# Patient Record
Sex: MALE | Race: OTHER | Employment: OTHER | URBAN - METROPOLITAN AREA
[De-identification: names, ages, dates, MRNs, and addresses within clinical notes are randomized per-mention and may not be internally consistent; named-entity substitution may affect disease eponyms.]

---

## 2017-05-02 ENCOUNTER — OFFICE VISIT (OUTPATIENT)
Dept: HEMATOLOGY | Age: 72
End: 2017-05-02

## 2017-05-02 ENCOUNTER — HOSPITAL ENCOUNTER (OUTPATIENT)
Dept: LAB | Age: 72
Discharge: HOME | End: 2017-05-02

## 2017-05-02 VITALS
WEIGHT: 260.13 LBS | HEIGHT: 70 IN | DIASTOLIC BLOOD PRESSURE: 98 MMHG | SYSTOLIC BLOOD PRESSURE: 139 MMHG | BODY MASS INDEX: 37.24 KG/M2 | OXYGEN SATURATION: 98 % | HEART RATE: 84 BPM | TEMPERATURE: 97.4 F

## 2017-05-02 DIAGNOSIS — B18.2 CHRONIC HEPATITIS C WITHOUT HEPATIC COMA (HCC): ICD-10-CM

## 2017-05-02 DIAGNOSIS — B18.2 CHRONIC HEPATITIS C WITHOUT HEPATIC COMA (HCC): Primary | ICD-10-CM

## 2017-05-02 PROCEDURE — 99001 SPECIMEN HANDLING PT-LAB: CPT | Performed by: INTERNAL MEDICINE

## 2017-05-02 NOTE — PROGRESS NOTES
93 Ricahrd Calix MD, HANY Leon PA-C Harlen Sinks, NP        at [address], [address] Manjit Bonner  22.     [phone]     FAX: [phone]    at LTAC, located within [facility]     [address], [address] [address]     Ana Jordan, [address]     [phone]     FAX: [phone]       Patient Care Team:  Fahad Lora MD as PCP - General (Unknown Physician Specialty)  Pollo Landaverde MD (Gastroenterology)  Fahad Lora MD (Unknown Physician Specialty)      Problem List  Date Reviewed: 11/1/2016          Codes Class Noted    A-fib [facility]) ICD-10-CM: I48.91  ICD-9-CM: 427.31  11/30/2015        Cirrhosis ([facility] ICD-10-CM: K74.60  ICD-9-CM: 571.5  6/30/2015        Elevated prostate specific antigen (PSA) ICD-10-CM: R97.20  ICD-9-CM: 790.93  5/10/2014        H/O resection of liver ICD-10-CM: Z90.49  ICD-9-CM: V15.29  12/24/2012    Overview Signed 12/24/2012  1:22 AM by Jac Huddleston MD     Partial right lobectomy. Reason for this is not known. Chronic hepatitis C ([facility] ICD-10-CM: B18.2  ICD-9-CM: 070.54  12/5/2012    Overview Addendum 6/30/2016  9:26 AM by Jac Huddleston MD     Peginterferon/ribavirin ~0530. Treated 6 months.   SOF/RBV relapse             Hypertension ICD-10-CM: I10  ICD-9-CM: 401.9  12/5/2012        PAT (paroxysmal atrial tachycardia) ([facility] ICD-10-CM: I47.1  ICD-9-CM: 427.0  12/5/2012    Overview Signed 12/5/2012  2:12 PM by Jac Huddleston MD     Ablation 2002             Colon cancer [facility]) ICD-10-CM: C18.9  ICD-9-CM: 153.9  12/5/2012    Overview Signed 12/5/2012  2:13 PM by Jac Huddleston MD     Partial colectomy 2004             S/P knee surgery ICD-10-CM: T35.974  ICD-9-CM: V45.89  12/5/2012    Overview Signed 12/5/2012  2:14 PM by Jac Huddleston MD     2010                   William Aguila. returns to the [place] [address] for management of cirrhosis secondary to chronic HCV. The active problem list, all pertinent past medical history, medications, liver histology, endoscopic studies, radiologic findings and laboratory findings related to the liver disorder were reviewed with the patient. The patient is a 66 y.o.  male who first found out he had HCV in the early 2000s when he was living in [place]. He was most likely exposed to [address] in the 1970s when he had an episode of acute icteric hepatitis. While living in [place] he was treated with peginterferon and ribavirin in the 2000s. He was treated with SOF and RBV for 12 weeks in 2015. He failed to achieve SVR with these treatments. He was treated with Epclusa and bid Ribavirin between 7/24/2016 -10/15/2016. He completed the prescribed 12 weeks of therapy. He had a virologic response but never became HCV RNA undetectable. The patient underwent a right lobe segmental liver resection while living in [place]. The pathology is not known but the patient states he was told this was benign. The most recent Ultrasound of the liver was performed in 5/2016. Results suggest cirrhosis. No liver mass lesions noted. A Fibroscan demonstrated cirrhosis with a value of 18.8 kPa    The patient notes fatigue, pain in the right side over the liver. The patient has not experienced problems concentrating, swelling of the abdomen, swelling of the lower extremities, hematemesis, hematochezia. The patient has Moderate limitations in functional activities which can be attributed to the liver disease and to other medical problems that are not related to the liver disease. ALLERGIES:  No Known Allergies    MEDICATIONS  Current Outpatient Prescriptions   Medication Sig    oxyCODONE-acetaminophen (PERCOCET 10)  mg per tablet Take 1 Tab by mouth every eight (8) hours as needed for Pain. Max Daily Amount: 3 Tabs.     potassium chloride SR (KLOR-CON 10) 10 mEq tablet Take  by mouth Every Mon, Wed & Sun.    furosemide (LASIX) 40 mg tablet Take  by mouth daily.  tamsulosin (FLOMAX) 0.4 mg capsule Take 0.4 mg by mouth daily.  warfarin (COUMADIN) 5 mg tablet Take 7.5 mg by mouth daily.  lisinopril (PRINIVIL, ZESTRIL) 5 mg tablet Take  by mouth daily. Indications: HYPERTENSION    glimepiride (AMARYL) 2 mg tablet Take  by mouth every morning.  metFORMIN (GLUCOPHAGE) 500 mg tablet Take  by mouth daily (with breakfast).  spironolactone (ALDACTONE) 25 mg tablet Take  by mouth daily. No current facility-administered medications for this visit. SYSTEM REVIEW NOT RELATED TO LIVER DISEASE OR REVIEWED ABOVE:  Constitution systems: Negative for fever, chills, weight gain, weight loss. Eyes: Negative for visual changes. ENT: Negative for sore throat, painful swallowing. Respiratory: Negative for cough, hemoptysis, SOB. Cardiology: Negative for chest pain, palpitations. GI:  Negative for constipation or diarrhea. Positive for low right abdominal pain. : Negative for urinary frequency, dysuria, hematuria, nocturia. Skin: Negative for rash. Hematology: Negative for easy bruising, blood clots. Musculo-skeletal: Negative for back pain, muscle pain, weakness. Neurologic: Negative for headaches, dizziness, vertigo, memory problems not related to HE. Psychology: Negative for anxiety, depression. FAMILY HISTORY  There is no family members with HCV or liver disease    SOCIAL HISTORY:  The patient is . The patient has 3 children, 7 grandchildren. The patient has never used tobacco products. The patient has been abstinent from alcohol since 1970s. The patient used to work as . The patient retired in 2005.       PHYSICAL EXAMINATION:  Visit Vitals    BP (!) 139/98    Pulse 84    Temp 97.4 °F (36.3 °C) (Tympanic)    Ht 5' 10\" (1.778 m)    Wt 260 lb 2 oz (118 kg)    SpO2 98%    BMI 37.32 kg/m2     General: No acute distress. Eyes: Sclera anicteric. ENT: No oral lesions. Thyroid normal.  Nodes: No adenopathy. Skin: No spider angiomata. No jaundice. No palmar erythema. Respiratory: Lungs clear to auscultation. Cardiovascular: Regular heart rate. No murmurs. No JVD. Abdomen: Soft non-tender. Liver size normal to percussion/palpation. Spleen not palpable. No obvious ascites. Extremities: No lower edema. No muscle wasting. No gross arthritic changes. Neurologic: Alert and oriented. Cranial nerves grossly intact. No asterixsis. LABORATORY STUDIES:   [facility] [place] [facility] [address] [place] [address] & Units 5/2/2017 11/1/2016   WBC 3.4 - 10.8 x10E3/uL 5.1 5.1   ANC 1.4 - 7.0 x10E3/uL 2.9 2.8   HGB 12.6 - 17.7 g/dL 13.6 12.9 (L)    - 379 x10E3/uL 182 227   INR 0.8 - 1.2    2.0 (H)   AST 0 - 40 IU/L 66 (H) 65 (H)   ALT 0 - 44 IU/L 72 (H) 103 (H)   Alk Phos 39 - 117 IU/L 79 56   Bili, Total 0.0 - 1.2 mg/dL 0.4 0.3   Bili, Direct 0.00 - 0.40 mg/dL 0.13 0.1   Albumin 3.5 - 4.8 g/dL 3.8 3.8   BUN 8 - 27 mg/dL 19 12   Creat 0.76 - 1.27 mg/dL 0.73 (L) 0.98   Na 134 - 144 mmol/L 142 143   K 3.5 - 5.2 mmol/L 4.0 4.4   Cl 96 - 106 mmol/L 102 105   CO2 18 - 29 mmol/L 24 27   Glucose 65 - 99 mg/dL 110 (H) 135 (H)     Cancer Screening AFP, Serum AFP-L3%   Latest Ref Rng & Units 0.0 - 8.0 ng/mL 0.0 - 9.9 %   5/2/2017 79.8 (H) 12.4 (H)   11/1/2016 5.0 8.5   8/9/2016 4.6 Comment   5/10/2016 4.8 7.8     SEROLOGIES:  8/2012. HBA1C 6.7    Serologies Latest Ref Rn 5/19/2015 12/5/2012   Hep A Ab, Total Negative  Positive (A)   Hep B Surface Ag Negative  Negative   Hep B Core Ab, Total Negative  Positive (A)   Hep B Surface Ab 0.00 - 0.99 Index Value  0.15   Hep C Genotype  2 CANCELED   HCV RT-PCR, Quant  [id number]    IL28B Genotype   TT genotype     5/2015. HCV RNA Positive  10/2015. HCV RNA undetectable  12/2015. HCV RNA log 6.7 intU  5/2016. HCV RNA Log 6.8 intU  6/2016.   HCV RNA positive  8/2016. Undetectable  11/2016.  690 intU  5/2017. HCV RNA Log 7 intU, HCV genotype 2    LIVER HISTOLOGY:  6/2015. FibroScan performed at [address]. EkPa was 18.8. Suggested fibrosis level is F4. ENDOSCOPIC PROCEDURES:  8/2015. EGD by Dr. Nithya Stanley. Small esophageal varices. No banding performed. Repeat in 2 years. RADIOLOGY:  12/2012. Ultrasound of liver. Echogenic consistent with cirrhosis. 4.5x3.6 cm right lobe complex mass lesion. No dilated bile ducts. No ascites. 12/2012. CT scan abdomen with and without IV contrast.  Evidence of prior right lobe resection. The liver otherwise appears normal.  No liver mass lesions. No dilated bile ducts. No bile duct strictures. No ascites. 6/2015. Ultrasound of liver. Echogenic consistent with chronic liver disease. No liver mass lesions. No dilated bile ducts. No ascites. Post surgical changes. 5/2016. Ultrasound of liver. Echogenic consistent with cirrhosis. No liver mass lesions. No dilated bile ducts. No ascites. OTHER TESTING:  Not available or performed    ASSESSMENT AND PLAN:  Chronic HCV with cirrhosis. Liver function is normal.  The platelet count is normal.      Will perform laboratory testing to monitor liver function and degree of liver injury. This will include BMP, hepatic panel, CBC with platelet count,     Will perform and/or review results of HCV viral load     Will repeat HCV genotype to ensure he is still HCV genotype 2. It is most unusual for patients with genotype 2 not to be cured. He was HCV genotype 2. Will perform imaging of the liver with ultrasound. The patient was previously treated for HCV with several different therapies including PEGINF and RBV, SOF and RBV, and most recently Epclusa and RBV. He has not achieved SVR. The patient has HCV genotype 2    Discussed the treatment alternatives.   The SVR/cure rate for HCV now exceeds 90% with just oral anti-viral therapy and no interferon injections or significant side effects for most patients with HCV. The specific treatment is dependent upon genotype, viral load and histology. The patient should be treated with G/P in the HCV Target DAA NR study. The patient was directed to continue all current medications at the current dosages. There are no contraindications for the patient to take any medications that are necessary for treatment of other medical issues. The patient was counseled regarding alcohol consumption. Vaccination for viral hepatitis A and B is not needed. The patient has serologic evidence of prior exposure or vaccination with immunity. [facility] screening will be performed. AFP was ordered today and ultrasound will be scheduled. This demonstrates an elevation in AFP. Will perform a dynamic MRI of liver to further characterize this abnormality and help determine if this represents [facility] Anemia was due to RBV and has now resolved that RBV has been stopped. All of the above issues were discussed with the patient. All questions were answered. The patient expressed a clear understanding of the above. [address] [place] [address] in 3 months to review all data and determine the treatment plan.     Sebastian Bolivar MD  [facility] [place] [address] [facility] HospitalTeri21 Marquez StreetruslanSelect Medical [facility] [place] [facility].  [phone]

## 2017-05-02 NOTE — MR AVS SNAPSHOT
Visit Information Date & Time Provider Department Dept. Phone Encounter #  
 5/2/2017  1:30 PM Bob Lujan. [address] [id number] Follow-up Instructions Return in about 4 months (around 9/2/2017) for Whitney Aguilar. Upcoming Health Maintenance Date Due DTaP/Tdap/Td series (1 - Tdap) 10/18/1964 FOBT Q 1 YEAR AGE 50-75 10/18/1993 ZOSTER VACCINE AGE 60> 10/18/2003 GLAUCOMA SCREENING Q2Y 10/18/2008 Pneumococcal 65+ High/Highest Risk (1 of 2 - PCV13) 10/18/2008 MEDICARE YEARLY EXAM 10/18/2008 INFLUENZA AGE 9 TO ADULT 8/1/2017 Allergies as of 5/2/2017  Review Complete On: 5/2/2017 By: Kaykay Forrest LPN No Known Allergies Current Immunizations  Never Reviewed No immunizations on file. Not reviewed this visit You Were Diagnosed With   
  
 Codes Comments Chronic hepatitis C without hepatic coma (HCC)    -  Primary ICD-10-CM: B18.2 ICD-9-CM: 070.54 Vitals BP Pulse Temp Height(growth percentile) Weight(growth percentile) SpO2  
 (!) 139/98 84 97.4 °F (36.3 °C) (Tympanic) 5' 10\" (1.778 m) 260 lb 2 oz (118 kg) 98% BMI Smoking Status 37.32 kg/m2 Former Smoker Vitals History BMI and BSA Data Body Mass Index Body Surface Area  
 37.32 kg/m 2 2.41 m 2 Preferred Pharmacy Pharmacy Name Phone [address] [place] [address] Drive [phone] Your Updated Medication List  
  
   
This list is accurate as of: 5/2/17  1:59 PM.  Always use your most recent med list.  
  
  
  
  
 COUMADIN 5 mg tablet Generic drug:  warfarin Take 7.5 mg by mouth daily. furosemide 40 mg tablet Commonly known as:  LASIX Take  by mouth daily. glimepiride 2 mg tablet Commonly known as:  AMARYL Take  by mouth every morning. lisinopril 5 mg tablet Commonly known as:  Minus Salk Take  by mouth daily. Indications: HYPERTENSION  
  
 metFORMIN 500 mg tablet Commonly known as:  GLUCOPHAGE Take  by mouth daily (with breakfast). oxyCODONE-acetaminophen  mg per tablet Commonly known as:  PERCOCET 10 Take 1 Tab by mouth every eight (8) hours as needed for Pain. Max Daily Amount: 3 Tabs. potassium chloride SR 10 mEq tablet Commonly known as:  KLOR-CON 10 Take  by mouth Every Mon, Wed & Sun.  
  
 spironolactone 25 mg tablet Commonly known as:  ALDACTONE Take  by mouth daily. tamsulosin 0.4 mg capsule Commonly known as:  FLOMAX Take 0.4 mg by mouth daily. Follow-up Instructions Return in about 4 months (around 9/2/2017) for [address]. To-Do List   
 05/02/2017 Lab:  AFP WITH AFP-L3% 05/02/2017 Lab:  CBC WITH AUTOMATED DIFF   
  
 05/02/2017 Lab:  HCV RNA BY KARLA QL,RFLX TO QT   
  
 05/02/2017 Lab:  HEP C GENOTYPE   
  
 05/02/2017 Lab:  HEPATIC FUNCTION PANEL   
  
 05/02/2017 Lab:  METABOLIC PANEL, BASIC Introducing [facility]! Dear Jaci Weinstein: 
Thank you for requesting a [employer] account. Our records indicate that you already have an active [employer] account. You can access your account anytime at [email or web address] Did you know that you can access your hospital and ER discharge instructions at any time in [employer]? You can also review all of your test results from your hospital stay or ER visit. Additional Information If you have questions, please visit the Frequently Asked Questions section of the [employer]ur[employer] website at [email or web address] Remember, [employer] is NOT to be used for urgent needs. For medical emergencies, dial 911. Now available from your iPhone and Android! Please provide this summary of care documentation to your next provider. Your primary care clinician is listed as Librado Ornelas. If you have any questions after today's visit, please call [phone].

## 2017-05-05 LAB
AFP L3 MFR SERPL: 12.4 % (ref 0–9.9)
AFP SERPL-MCNC: 79.8 NG/ML (ref 0–8)
ALBUMIN SERPL-MCNC: 3.8 G/DL (ref 3.5–4.8)
ALP SERPL-CCNC: 79 IU/L (ref 39–117)
ALT SERPL-CCNC: 72 IU/L (ref 0–44)
AST SERPL-CCNC: 66 IU/L (ref 0–40)
BASOPHILS # BLD AUTO: 0 X10E3/UL (ref 0–0.2)
BASOPHILS NFR BLD AUTO: 1 %
BILIRUB DIRECT SERPL-MCNC: 0.13 MG/DL (ref 0–0.4)
BILIRUB SERPL-MCNC: 0.4 MG/DL (ref 0–1.2)
BUN SERPL-MCNC: 19 MG/DL (ref 8–27)
BUN/CREAT SERPL: 26 (ref 10–24)
CALCIUM SERPL-MCNC: 9.2 MG/DL (ref 8.6–10.2)
CHLORIDE SERPL-SCNC: 102 MMOL/L (ref 96–106)
CO2 SERPL-SCNC: 24 MMOL/L (ref 18–29)
CREAT SERPL-MCNC: 0.73 MG/DL (ref 0.76–1.27)
EOSINOPHIL # BLD AUTO: 0.2 X10E3/UL (ref 0–0.4)
EOSINOPHIL NFR BLD AUTO: 4 %
ERYTHROCYTE [DISTWIDTH] IN BLOOD BY AUTOMATED COUNT: 14 % (ref 12.3–15.4)
GLUCOSE SERPL-MCNC: 110 MG/DL (ref 65–99)
HCT VFR BLD AUTO: 41.3 % (ref 37.5–51)
HCV GENTYP SERPL NAA+PROBE: NORMAL
HCV RNA SERPL NAA+PROBE-ACNC: NORMAL IU/ML
HCV RNA SERPL NAA+PROBE-ACNC: NORMAL IU/ML
HCV RNA SERPL NAA+PROBE-LOG IU: 7.04 {LOG_IU}/ML
HCV RNA SERPL QL NAA+PROBE: POSITIVE
HGB BLD-MCNC: 13.6 G/DL (ref 12.6–17.7)
IMM GRANULOCYTES # BLD: 0.1 X10E3/UL (ref 0–0.1)
IMM GRANULOCYTES NFR BLD: 1 %
LYMPHOCYTES # BLD AUTO: 1.5 X10E3/UL (ref 0.7–3.1)
LYMPHOCYTES NFR BLD AUTO: 29 %
MCH RBC QN AUTO: 33.6 PG (ref 26.6–33)
MCHC RBC AUTO-ENTMCNC: 32.9 G/DL (ref 31.5–35.7)
MCV RBC AUTO: 102 FL (ref 79–97)
MONOCYTES # BLD AUTO: 0.5 X10E3/UL (ref 0.1–0.9)
MONOCYTES NFR BLD AUTO: 9 %
NEUTROPHILS # BLD AUTO: 2.9 X10E3/UL (ref 1.4–7)
NEUTROPHILS NFR BLD AUTO: 56 %
PLATELET # BLD AUTO: 182 X10E3/UL (ref 150–379)
PLEASE NOTE, 550474: NORMAL
POTASSIUM SERPL-SCNC: 4 MMOL/L (ref 3.5–5.2)
PROT SERPL-MCNC: 7 G/DL (ref 6–8.5)
RBC # BLD AUTO: 4.05 X10E6/UL (ref 4.14–5.8)
SODIUM SERPL-SCNC: 142 MMOL/L (ref 134–144)
TEST INFORMATION: NORMAL
WBC # BLD AUTO: 5.1 X10E3/UL (ref 3.4–10.8)

## 2017-06-18 DIAGNOSIS — K74.60 CIRRHOSIS OF LIVER WITHOUT ASCITES, UNSPECIFIED HEPATIC CIRRHOSIS TYPE (HCC): Primary | ICD-10-CM

## 2017-06-18 DIAGNOSIS — R77.2 ELEVATED AFP: ICD-10-CM

## 2017-06-28 ENCOUNTER — HOSPITAL ENCOUNTER (OUTPATIENT)
Dept: LAB | Age: 72
Discharge: HOME | End: 2017-06-28

## 2017-06-28 ENCOUNTER — OFFICE VISIT (OUTPATIENT)
Dept: HEMATOLOGY | Age: 72
End: 2017-06-28

## 2017-06-28 VITALS
SYSTOLIC BLOOD PRESSURE: 133 MMHG | HEART RATE: 61 BPM | RESPIRATION RATE: 16 BRPM | WEIGHT: 262.6 LBS | HEIGHT: 70 IN | DIASTOLIC BLOOD PRESSURE: 82 MMHG | OXYGEN SATURATION: 97 % | TEMPERATURE: 95.2 F | BODY MASS INDEX: 37.59 KG/M2

## 2017-06-28 DIAGNOSIS — K74.60 HEPATIC CIRRHOSIS, UNSPECIFIED HEPATIC CIRRHOSIS TYPE (HCC): Primary | ICD-10-CM

## 2017-06-28 DIAGNOSIS — B18.2 CHRONIC HEPATITIS C WITHOUT HEPATIC COMA (HCC): ICD-10-CM

## 2017-06-28 PROCEDURE — 99001 SPECIMEN HANDLING PT-LAB: CPT | Performed by: NURSE PRACTITIONER

## 2017-06-28 RX ORDER — POTASSIUM CHLORIDE 750 MG/1
CAPSULE, EXTENDED RELEASE ORAL
Refills: 2 | COMMUNITY
Start: 2017-06-18 | End: 2019-08-20

## 2017-06-28 RX ORDER — OXYCODONE HYDROCHLORIDE 5 MG/1
TABLET ORAL
Refills: 0 | COMMUNITY
Start: 2017-04-13 | End: 2017-06-28

## 2017-06-28 RX ORDER — METOPROLOL SUCCINATE 25 MG/1
TABLET, EXTENDED RELEASE ORAL
Refills: 3 | COMMUNITY
Start: 2017-04-17

## 2017-06-28 RX ORDER — PIOGLITAZONEHYDROCHLORIDE 30 MG/1
TABLET ORAL
Refills: 2 | COMMUNITY
Start: 2017-06-17

## 2017-06-28 RX ORDER — OXYCODONE AND ACETAMINOPHEN 10; 325 MG/1; MG/1
1 TABLET ORAL
Qty: 90 TAB | Refills: 0 | Status: SHIPPED | OUTPATIENT
Start: 2017-06-28 | End: 2017-09-06 | Stop reason: ALTCHOICE

## 2017-06-28 NOTE — MR AVS SNAPSHOT
Visit Information Date & Time Provider Department Dept. Phone Encounter #  
 6/28/2017  4:00 PM Gee Palencia NP [facility] [place] of [address] [place] [address] [id number] Your Appointments 6/28/2017  4:00 PM  
Follow Up with Gee Palencia NP [facility] [place] [facility] ([address]) Appt Note: add  
 [address] [place] [address]  
[phone]  
  
   
 [address]  
  
    
 9/6/2017  1:30 PM  
Follow Up with Gee Palencia NP [facility] [place] [facility] ([address]) Appt Note: HCV/Cirrhosis [address] [place] [address]  
[phone] Upcoming Health Maintenance Date Due DTaP/Tdap/Td series (1 - Tdap) 10/18/1964 FOBT Q 1 YEAR AGE 50-75 10/18/1993 ZOSTER VACCINE AGE 60> 10/18/2003 GLAUCOMA SCREENING Q2Y 10/18/2008 Pneumococcal 65+ High/Highest Risk (1 of 2 - PCV13) 10/18/2008 MEDICARE YEARLY EXAM 10/18/2008 INFLUENZA AGE 9 TO ADULT 8/1/2017 Allergies as of 6/28/2017  Review Complete On: 6/28/2017 By: Liudmila Farris No Known Allergies Current Immunizations  Never Reviewed No immunizations on file. Not reviewed this visit You Were Diagnosed With   
  
 Codes Comments Hepatic cirrhosis, unspecified hepatic cirrhosis type ([facility]    -  Primary ICD-10-CM: K74.60 ICD-9-CM: 571.5 Chronic hepatitis C without hepatic coma (HCC)     ICD-10-CM: B18.2 ICD-9-CM: 070.54 Vitals BP Pulse Temp Resp Height(growth percentile) Weight(growth percentile) 133/82 (BP 1 Location: Right arm, BP Patient Position: Sitting) 61 95.2 °F (35.1 °C) (Oral) 16 5' 10\" (1.778 m) 262 lb 9.6 oz (119.1 kg) SpO2 BMI Smoking Status 97% 37.68 kg/m2 Former Smoker BMI and BSA Data Body Mass Index Body Surface Area  
 37.68 kg/m 2 2.43 m 2 Preferred Pharmacy Pharmacy Name Phone [address] [phone] Your Updated Medication List  
  
   
This list is accurate as of: 6/28/17  2:33 PM.  Always use your most recent med list.  
  
  
  
  
 COUMADIN 5 mg tablet Generic drug:  warfarin Take 7.5 mg by mouth daily. furosemide 40 mg tablet Commonly known as:  LASIX Take  by mouth daily. glimepiride 2 mg tablet Commonly known as:  AMARYL Take  by mouth every morning. lisinopril 5 mg tablet Commonly known as:  Megan Gaster Take  by mouth daily. Indications: HYPERTENSION  
  
 metFORMIN 500 mg tablet Commonly known as:  GLUCOPHAGE Take  by mouth daily (with breakfast). metoprolol succinate 25 mg XL tablet Commonly known as:  TOPROL-XL TK 1 T PO D  
  
 oxyCODONE-acetaminophen  mg per tablet Commonly known as:  PERCOCET 10 Take 1 Tab by mouth every eight (8) hours as needed for Pain. Max Daily Amount: 3 Tabs. pioglitazone 30 mg tablet Commonly known as:  ACTOS TK 1 T PO  D  
  
 * potassium chloride SR 10 mEq tablet Commonly known as:  KLOR-CON 10 Take  by mouth Every Mon, Wed & Sun.  
  
 * potassium chloride SA 10 mEq capsule Commonly known as:  MICRO-K  
TK 1 C PO D  
  
 spironolactone 25 mg tablet Commonly known as:  ALDACTONE Take  by mouth daily. tamsulosin 0.4 mg capsule Commonly known as:  FLOMAX Take 0.4 mg by mouth daily. * Notice: This list has 2 medication(s) that are the same as other medications prescribed for you. Read the directions carefully, and ask your doctor or other care provider to review them with you. Prescriptions Printed Refills  
 oxyCODONE-acetaminophen (PERCOCET 10)  mg per tablet 0 Sig: Take 1 Tab by mouth every eight (8) hours as needed for Pain. Max Daily Amount: 3 Tabs. Class: Print  Route: Oral  
  
 To-Do List   
 06/28/2017 Lab:  AFP WITH AFP-L3%   
  
 06/28/2017 Lab:  CBC WITH AUTOMATED DIFF   
  
 06/28/2017 Lab:  HEPATIC FUNCTION PANEL   
  
 06/28/2017 Lab:  METABOLIC PANEL, BASIC   
  
 06/28/2017 Lab:  PROTHROMBIN TIME + INR Introducing [facility]! Dear Cinthia Bean: 
Thank you for requesting a [employer] account. Our records indicate that you already have an active [employer] account. You can access your account anytime at [email or web address] Did you know that you can access your hospital and ER discharge instructions at any time in [employer]? You can also review all of your test results from your hospital stay or ER visit. Additional Information If you have questions, please visit the Frequently Asked Questions section of the [employer] website at [email or web address] Remember, [employer] is NOT to be used for urgent needs. For medical emergencies, dial 911. Now available from your iPhone and Android! Please provide this summary of care documentation to your next provider. Your primary care clinician is listed as Deanna Tierney. If you have any questions after today's visit, please call [phone].

## 2017-06-28 NOTE — PROGRESS NOTES
[address] Kaleb Jacobs MD, HANY Antoine PA-C Eugenie Dale, NP        at [address], [address] Manjit Bonner  22.     [phone]     FAX: [phone]    at [facility]     [address], [address] [address]     [facility], [address]     [phone]     FAX: [phone]       Patient Care Team:  Ada Farnsworth MD as PCP - General (Unknown Physician Specialty)  Mejia Ch MD (Gastroenterology)  Ada Farnsworth MD (Unknown Physician Specialty)      Problem List  Date Reviewed: 6/28/2017          Codes Class Noted    A-fib [facility]) ICD-10-CM: I48.91  ICD-9-CM: 427.31  11/30/2015        Cirrhosis ([facility] ICD-10-CM: K74.60  ICD-9-CM: 571.5  6/30/2015        Elevated prostate specific antigen (PSA) ICD-10-CM: R97.20  ICD-9-CM: 790.93  5/10/2014        H/O resection of liver ICD-10-CM: Z90.49  ICD-9-CM: V15.29  12/24/2012    Overview Signed 12/24/2012  1:22 AM by Bob Parks MD     Partial right lobectomy. Reason for this is not known. Chronic hepatitis C ([facility] ICD-10-CM: B18.2  ICD-9-CM: 070.54  12/5/2012    Overview Addendum 6/30/2016  9:26 AM by Bob Parks MD     Peginterferon/ribavirin ~9854. Treated 6 months.   SOF/RBV relapse             Hypertension ICD-10-CM: I10  ICD-9-CM: 401.9  12/5/2012        PAT (paroxysmal atrial tachycardia) ([facility] ICD-10-CM: I47.1  ICD-9-CM: 427.0  12/5/2012    Overview Signed 12/5/2012  2:12 PM by Bob Parks MD     Ablation 2002             Colon cancer [facility]) ICD-10-CM: C18.9  ICD-9-CM: 153.9  12/5/2012    Overview Signed 12/5/2012  2:13 PM by Bob Parks MD     Partial colectomy 2004             S/P knee surgery ICD-10-CM: S99.492  ICD-9-CM: V45.89  12/5/2012    Overview Signed 12/5/2012  2:14 PM by Bob Parks MD     2010                   [facility]. returns to the [place] [address] for management of cirrhosis secondary to chronic HCV. The active problem list, all pertinent past medical history, medications, liver histology, endoscopic studies, radiologic findings and laboratory findings related to the liver disorder were reviewed with the patient. The patient is a 66 y.o.  male who first found out he had HCV in the early 2000s when he was living in [place]. He was most likely exposed to [address] in the 1970s when he had an episode of acute icteric hepatitis. While living in [place] he was treated with peginterferon and ribavirin in the 2000s. He was treated with SOF and RBV for 12 weeks in 2015. He failed to achieve SVR with these treatments. He was treated with Epclusa and bid Ribavirin between 7/24/2016 -10/15/2016. He completed the prescribed 12 weeks of therapy. He had a virologic response but never became HCV RNA undetectable. The patient underwent a right lobe segmental liver resection while living in [place]. The pathology is not known but the patient states he was told this was benign. The most recent Ultrasound of the liver was performed in 5/2016. Results suggest cirrhosis. No liver mass lesions noted. A Fibroscan demonstrated cirrhosis with a value of 18.8 kPa    The patient notes fatigue, pain in the right side over the liver. The patient has not experienced problems concentrating, swelling of the abdomen, swelling of the lower extremities, hematemesis, hematochezia. The patient has Moderate limitations in functional activities which can be attributed to the liver disease and to other medical problems that are not related to the liver disease.       ALLERGIES:  No Known Allergies    MEDICATIONS  Current Outpatient Prescriptions   Medication Sig    metoprolol succinate (TOPROL-XL) 25 mg XL tablet TK 1 T PO D    pioglitazone (ACTOS) 30 mg tablet TK 1 T PO  D    potassium chloride SA (MICRO-K) 10 mEq capsule TK 1 C PO D    oxyCODONE-acetaminophen (PERCOCET 10)  mg per tablet Take 1 Tab by mouth every eight (8) hours as needed for Pain. Max Daily Amount: 3 Tabs.  potassium chloride SR (KLOR-CON 10) 10 mEq tablet Take  by mouth Every Mon, Wed & Sun.    furosemide (LASIX) 40 mg tablet Take  by mouth daily.  tamsulosin (FLOMAX) 0.4 mg capsule Take 0.4 mg by mouth daily.  warfarin (COUMADIN) 5 mg tablet Take 7.5 mg by mouth daily.  lisinopril (PRINIVIL, ZESTRIL) 5 mg tablet Take  by mouth daily. Indications: HYPERTENSION    glimepiride (AMARYL) 2 mg tablet Take  by mouth every morning.  metFORMIN (GLUCOPHAGE) 500 mg tablet Take  by mouth daily (with breakfast).  spironolactone (ALDACTONE) 25 mg tablet Take  by mouth daily. No current facility-administered medications for this visit. SYSTEM REVIEW NOT RELATED TO LIVER DISEASE OR REVIEWED ABOVE:  Constitution systems: Negative for fever, chills, weight gain, weight loss. Eyes: Negative for visual changes. ENT: Negative for sore throat, painful swallowing. Respiratory: Negative for cough, hemoptysis, SOB. Cardiology: Negative for chest pain, palpitations. GI:  Negative for constipation or diarrhea. Positive for low right abdominal pain. : Negative for urinary frequency, dysuria, hematuria, nocturia. Skin: Negative for rash. Hematology: Negative for easy bruising, blood clots. Musculo-skeletal: Negative for back pain, muscle pain, weakness. Neurologic: Negative for headaches, dizziness, vertigo, memory problems not related to HE. Psychology: Negative for anxiety, depression. FAMILY HISTORY  There is no family members with HCV or liver disease    SOCIAL HISTORY:  The patient is . The patient has 3 children, 7 grandchildren. The patient has never used tobacco products. The patient has been abstinent from alcohol since 1970s. The patient used to work as . The patient retired in 2005. PHYSICAL EXAMINATION:  Visit Vitals    /82 (BP 1 Location: Right arm, BP Patient Position: Sitting)    Pulse 61    Temp 95.2 °F (35.1 °C) (Oral)    Resp 16    Ht 5' 10\" (1.778 m)    Wt 262 lb 9.6 oz (119.1 kg)    SpO2 97%    BMI 37.68 kg/m2     General: No acute distress. Eyes: Sclera anicteric. ENT: No oral lesions. Thyroid normal.  Nodes: No adenopathy. Skin: No spider angiomata. No jaundice. No palmar erythema. Respiratory: Lungs clear to auscultation. Cardiovascular: Regular heart rate. No murmurs. No JVD. Abdomen: Soft non-tender. Liver size normal to percussion/palpation. Spleen not palpable. No obvious ascites. Extremities: No lower edema. No muscle wasting. No gross arthritic changes. Neurologic: Alert and oriented. Cranial nerves grossly intact. No asterixsis. LABORATORY STUDIES:   [facility] [address] & Units 5/2/2017 11/1/2016   WBC 3.4 - 10.8 x10E3/uL 5.1 5.1   ANC 1.4 - 7.0 x10E3/uL 2.9 2.8   HGB 12.6 - 17.7 g/dL 13.6 12.9 (L)    - 379 x10E3/uL 182 227   INR 0.8 - 1.2    2.0 (H)   AST 0 - 40 IU/L 66 (H) 65 (H)   ALT 0 - 44 IU/L 72 (H) 103 (H)   Alk Phos 39 - 117 IU/L 79 56   Bili, Total 0.0 - 1.2 mg/dL 0.4 0.3   Bili, Direct 0.00 - 0.40 mg/dL 0.13 0.1   Albumin 3.5 - 4.8 g/dL 3.8 3.8   BUN 8 - 27 mg/dL 19 12   Creat 0.76 - 1.27 mg/dL 0.73 (L) 0.98   Na 134 - 144 mmol/L 142 143   K 3.5 - 5.2 mmol/L 4.0 4.4   Cl 96 - 106 mmol/L 102 105   CO2 18 - 29 mmol/L 24 27   Glucose 65 - 99 mg/dL 110 (H) 135 (H)     Cancer Screening Latest Ref Rng & Units 5/2/2017 11/1/2016 8/9/2016   AFP, Serum 0.0 - 8.0 ng/mL 79.8 (H) 5.0 4.6   AFP-L3% 0.0 - 9.9 % 12.4 (H) 8.5 Comment     SEROLOGIES:  8/2012.   HBA1C 6.7    Serologies Latest Ref Rng 5/19/2015 12/5/2012   Hep A Ab, Total Negative  Positive (A)   Hep B Surface Ag Negative  Negative   Hep B Core Ab, Total Negative  Positive (A)   Hep B Surface Ab 0.00 - 0.99 Index Value  0.15   Hep C Genotype  2 CANCELED   HCV RT-PCR, Quant  [id number]    IL28B Genotype   TT genotype     5/2015. HCV RNA Positive  10/2015. HCV RNA undetectable  12/2015. HCV RNA log 6.7 intU  5/2016. HCV RNA Log 6.8 intU  6/2016. HCV RNA positive  8/2016. Undetectable  11/2016.  690 intU  5/2017. HCV RNA Log 7 intU, HCV genotype 2    LIVER HISTOLOGY:  6/2015. FibroScan performed at [address]. EkPa was 18.8. Suggested fibrosis level is F4. ENDOSCOPIC PROCEDURES:  8/2015. EGD by Dr. Cande Guevara. Small esophageal varices. No banding performed. Repeat in 2 years. RADIOLOGY:  12/2012. Ultrasound of liver. Echogenic consistent with cirrhosis. 4.5x3.6 cm right lobe complex mass lesion. No dilated bile ducts. No ascites. 12/2012. CT scan abdomen with and without IV contrast.  Evidence of prior right lobe resection. The liver otherwise appears normal.  No liver mass lesions. No dilated bile ducts. No bile duct strictures. No ascites. 6/2015. Ultrasound of liver. Echogenic consistent with chronic liver disease. No liver mass lesions. No dilated bile ducts. No ascites. Post surgical changes. 5/2016. Ultrasound of liver. Echogenic consistent with cirrhosis. No liver mass lesions. No dilated bile ducts. No ascites. OTHER TESTING:  Not available or performed    ASSESSMENT AND PLAN:  Chronic HCV with cirrhosis. The most recent laboratory studies indicate that the liver transaminases are elevated, alkaline phosphatase is normal, tests of hepatic synthetic and metabolic function are normal, and the platelet count is normal.  Will perform laboratory testing to monitor liver function and degree of liver injury. This will include hepatic panel, a CBC w/ diff, a BMP, a PT/INR, and an AFP-L3%. Complications of cirrhosis were discussed in detail. We discussed thrombocytopenia, portal hypertension, varices, GI bleeding, peripheral edema, ascites, hepatic encephalopathy, and hepatocellular carcinoma. We discussed the need for follow ups on a regular basis, at 3 month intervals to monitor for complications. We discussed the need for every 6 month liver imaging studies. I am very concerned that the patient may have developed an [address] His AFP has jumped from the normal range to about 80. A dynamic MRI was ordered for him a couple of weeks ago, but he has not had this completed yet. I stressed the importance of following up on this as soon as possible. The patient was previously treated for HCV with several different therapies including PEGINF and RBV, SOF and RBV, and most recently Epclusa and RBV. He has not achieved SVR. The patient has HCV genotype 2    Discussed the treatment alternatives. The SVR/cure rate for HCV now exceeds 90% with just oral anti-viral therapy and no interferon injections or significant side effects for most patients with HCV. The specific treatment is dependent upon genotype, viral load and histology. The patient should be treated with G/P in the HCV Target DAA NR study. The patient was directed to continue all current medications at the current dosages. There are no contraindications for the patient to take any medications that are necessary for treatment of other medical issues. The patient was counseled regarding alcohol consumption. Vaccination for viral hepatitis A and B is not needed. The patient has serologic evidence of prior exposure or vaccination with immunity. Anemia was due to RBV and has now resolved that RBV has been stopped. All of the above issues were discussed with the patient. All questions were answered. The patient expressed a clear understanding of the above. 30 minutes total time spent with this patient with more than 50% of this time spent counseling and coordinating care as described above. [facility] [place] [address] in 3 months to review all data and determine the treatment plan.     Josselin Sorensen, NP   L[facility] of [facility], [address] [facility]   [place], [address] [facility] [address]   [phone]

## 2017-06-29 LAB
AFP L3 MFR SERPL: 14.5 % (ref 0–9.9)
AFP SERPL-MCNC: 46.7 NG/ML (ref 0–8)
ALBUMIN SERPL-MCNC: 4.1 G/DL (ref 3.5–4.8)
ALP SERPL-CCNC: 63 IU/L (ref 39–117)
ALT SERPL-CCNC: 90 IU/L (ref 0–44)
AST SERPL-CCNC: 73 IU/L (ref 0–40)
BASOPHILS # BLD AUTO: 0 X10E3/UL (ref 0–0.2)
BASOPHILS NFR BLD AUTO: 1 %
BILIRUB DIRECT SERPL-MCNC: 0.16 MG/DL (ref 0–0.4)
BILIRUB SERPL-MCNC: 0.6 MG/DL (ref 0–1.2)
BUN SERPL-MCNC: 20 MG/DL (ref 8–27)
BUN/CREAT SERPL: 24 (ref 10–24)
CALCIUM SERPL-MCNC: 9.9 MG/DL (ref 8.6–10.2)
CHLORIDE SERPL-SCNC: 99 MMOL/L (ref 96–106)
CO2 SERPL-SCNC: 23 MMOL/L (ref 18–29)
CREAT SERPL-MCNC: 0.82 MG/DL (ref 0.76–1.27)
EOSINOPHIL # BLD AUTO: 0.2 X10E3/UL (ref 0–0.4)
EOSINOPHIL NFR BLD AUTO: 3 %
ERYTHROCYTE [DISTWIDTH] IN BLOOD BY AUTOMATED COUNT: 13.7 % (ref 12.3–15.4)
GLUCOSE SERPL-MCNC: 84 MG/DL (ref 65–99)
HCT VFR BLD AUTO: 42.4 % (ref 37.5–51)
HGB BLD-MCNC: 14.4 G/DL (ref 12.6–17.7)
IMM GRANULOCYTES # BLD: 0.1 X10E3/UL (ref 0–0.1)
IMM GRANULOCYTES NFR BLD: 1 %
INR PPP: 1.4 (ref 0.8–1.2)
LYMPHOCYTES # BLD AUTO: 1.8 X10E3/UL (ref 0.7–3.1)
LYMPHOCYTES NFR BLD AUTO: 32 %
MCH RBC QN AUTO: 33.5 PG (ref 26.6–33)
MCHC RBC AUTO-ENTMCNC: 34 G/DL (ref 31.5–35.7)
MCV RBC AUTO: 99 FL (ref 79–97)
MONOCYTES # BLD AUTO: 0.7 X10E3/UL (ref 0.1–0.9)
MONOCYTES NFR BLD AUTO: 13 %
NEUTROPHILS # BLD AUTO: 2.9 X10E3/UL (ref 1.4–7)
NEUTROPHILS NFR BLD AUTO: 50 %
PLATELET # BLD AUTO: 170 X10E3/UL (ref 150–379)
POTASSIUM SERPL-SCNC: 4.4 MMOL/L (ref 3.5–5.2)
PROT SERPL-MCNC: 8 G/DL (ref 6–8.5)
PROTHROMBIN TIME: 14.3 SEC (ref 9.1–12)
RBC # BLD AUTO: 4.3 X10E6/UL (ref 4.14–5.8)
SODIUM SERPL-SCNC: 139 MMOL/L (ref 134–144)
WBC # BLD AUTO: 5.6 X10E3/UL (ref 3.4–10.8)

## 2017-07-10 ENCOUNTER — TELEPHONE (OUTPATIENT)
Dept: HEMATOLOGY | Age: 72
End: 2017-07-10

## 2017-07-10 NOTE — TELEPHONE ENCOUNTER
Patient is asking for a open MRI. He have a MRI scheduled at [facility] on the 12 of July, but can't do the appt because they do not have a open MRI. So he is asking if he can have the MRI done here at  [facility] but for it to be open because he has claustrophobia.

## 2017-07-10 NOTE — TELEPHONE ENCOUNTER
Spoke to patient this AM to inform him that the order had been faxed to [facility] for scheduling, Informed him that there are no open MRI locations in the area. Pt agreed to have the procedure done at [facility] as planned.

## 2017-07-10 NOTE — TELEPHONE ENCOUNTER
Fax [phone]  Patient needs orders put in the system or faxed over or patient's appointment will be canceled. Schedule says it is supposed to be MRI of liver. I let deanna know that I will send a message to HANY Yo.

## 2017-07-11 NOTE — TELEPHONE ENCOUNTER
Called [facility] MRI department to verify that MRI orders were received. Faxed order [id number] to fax number [phone] and verified with MRI technician that orders were received. After confirmation patient was called and informed the final step is to reschedule appointment. Patient confirmed understanding.

## 2017-07-11 NOTE — TELEPHONE ENCOUNTER
Patient called to ask for some help. [facility] radiology called him and informed him that his appointment for tomorrow is canceled due to no orders in the system. I told him that we have the orders in the system and that if they need it faxed over that we would be more than happy to do so. Patient confirmed understanding but requested that we contact [facility] radiology directly to resolve situation.

## 2017-07-26 ENCOUNTER — TELEPHONE (OUTPATIENT)
Dept: HEMATOLOGY | Age: 72
End: 2017-07-26

## 2017-07-27 ENCOUNTER — TELEPHONE (OUTPATIENT)
Dept: HEMATOLOGY | Age: 72
End: 2017-07-27

## 2017-07-27 ENCOUNTER — DOCUMENTATION ONLY (OUTPATIENT)
Dept: HEMATOLOGY | Age: 72
End: 2017-07-27

## 2017-07-27 NOTE — TELEPHONE ENCOUNTER
called in response to a missed call from HANY Dutton. Patient states that he will be available all day today. Please call at earliest convenience.

## 2017-07-27 NOTE — TELEPHONE ENCOUNTER
Patient would like to know if the CD with the MRI needs to be dropped off at the [facility] office in [place]. If so, he would like the address and number to the office.

## 2017-07-27 NOTE — PROGRESS NOTES
Pt scheduled with interventional radiologist Dr. Chelita Pope at [facility] Friday 07/28/17 at 1500.

## 2017-07-27 NOTE — PROGRESS NOTES
NN reached out to Mariana Owens regarding pt referral to IR Dr. Saúl Grace, pt's name and contact information provided.

## 2017-07-27 NOTE — TELEPHONE ENCOUNTER
Notified YG Perez who is in the process of calling this patient for education on treatment procedure.

## 2017-08-07 DIAGNOSIS — R77.2 ELEVATED AFP: ICD-10-CM

## 2017-08-07 DIAGNOSIS — K74.60 CIRRHOSIS OF LIVER WITHOUT ASCITES, UNSPECIFIED HEPATIC CIRRHOSIS TYPE (HCC): ICD-10-CM

## 2017-08-10 NOTE — CONSULTS
Interventional Oncology Consult Note    Patient: Jessy Cadet. Sex: male          DOA: (Not on file)    7/31/2017    Referring physician: Dr. Veena Thomason    CC: Hepatoma. HPI:     66 y.o.  male with cirrhosis secondary to chronic HCV. Lizzette Samuel was Dx in early 2000s while residing in [place]. Patient remains HCV RNA + despite second HCV treatment. Patient also had right lobe segmental resection while living in [place]. Pathology was benign according to patient. Hemangioma? Most recent MRI (7/22/2017) showed segments 5/6/7/1 lesions with largest questionably infiltrating HCC in segment 7. Left lobe shows no lesions. No ascites. Portal vein is patent. No extra-hepatic ds. Most recent [address] liver 5/16 showed no masses but suggested cirrhosis. \Fibroscan demonstrated cirrhosis (18.8 kPa). \No complications of cirrhosis. ECOG 0. H/O IVDA and EtOh use in the past.    AFP 6/28/2017 46.7/14.5. Patient was referred to me by Dr. Veena Thomason for a discussion regarding possible infiltrating lesion treatment. Patient had no lesion Bx or random liver Bx as of yet. Patient came to my office with his wife. Patient does not have bleeding, ascites, jaundice, peripheral edema or encephalopathy. Patient states RUQ intermittent pain since liver surgery. Patient has good appetite.     Past Medical History:   Diagnosis Date    [facility] ([facility]     Diabetes ([facility] 64y/o    Elevated PSA     Headache     Hemangioma     Hep C w/o coma, chronic (HCC)     Hypertension 64y/o       Past Surgical History:   Procedure Laterality Date    COLONOSCOPY N/A 6/20/2016    COLONOSCOPY performed by Samreen Barahona MD at [facility] ENDOSCOPY    HX COLECTOMY      HX COLONOSCOPY      HX OTHER SURGICAL      liver surgery       Family History   Problem Relation Age of Onset    Stroke Father        Social History     Social History    Marital status:      Spouse name: N/A    Number of children: N/A    Years of education: N/A     Social History Main Topics    Smoking status: Former Smoker     Quit date: 8/4/1991    Smokeless tobacco: Never Used    Alcohol use No    Drug use: No    Sexual activity: Not on file     Other Topics Concern    Not on file     Social History Narrative    ** Merged History Encounter **            Prior to Admission medications    Medication Sig Start Date End Date Taking? Authorizing Provider   metoprolol succinate (TOPROL-XL) 25 mg XL tablet TK 1 T PO D 4/17/17   Historical Provider   pioglitazone (ACTOS) 30 mg tablet TK 1 T PO  D 6/17/17   Historical Provider   potassium chloride SA (MICRO-K) 10 mEq capsule TK 1 C PO D 6/18/17   Historical Provider   oxyCODONE-acetaminophen (PERCOCET 10)  mg per tablet Take 1 Tab by mouth every eight (8) hours as needed for Pain. Max Daily Amount: 3 Tabs. 6/28/17   Kelvin Martin NP   potassium chloride SR (KLOR-CON 10) 10 mEq tablet Take  by mouth Every Mon, Wed & Sun.    Historical Provider   furosemide (LASIX) 40 mg tablet Take  by mouth daily. Historical Provider   tamsulosin (FLOMAX) 0.4 mg capsule Take 0.4 mg by mouth daily. Historical Provider   warfarin (COUMADIN) 5 mg tablet Take 7.5 mg by mouth daily. Historical Provider   lisinopril (PRINIVIL, ZESTRIL) 5 mg tablet Take  by mouth daily. Indications: HYPERTENSION    Historical Provider   glimepiride (AMARYL) 2 mg tablet Take  by mouth every morning. Historical Provider   metFORMIN (GLUCOPHAGE) 500 mg tablet Take  by mouth daily (with breakfast). Historical Provider   spironolactone (ALDACTONE) 25 mg tablet Take  by mouth daily. Historical Provider       No Known Allergies       Review of Systems  A comprehensive review of systems was negative except for that written in the History of Present Illness. Physical Exam:      There were no vitals taken for this visit. Physical Exam:    General: No acute distress. \b Eyes: Sclera anicteric.  \b ENT: No oral lesions.  Thyroid normal.\b Nodes: No adenopathy. \b Skin: No spider angiomata.  No jaundice.  No palmar erythema. \b Respiratory: Lungs clear to auscultation. \b Cardiovascular: Regular heart rate.  No murmurs.  No JVD. \b Abdomen: Soft non-tender.  Liver size normal to percussion/palpation.  Spleen not palpable. No obvious ascites. \b Extremities: No lower edema.  No muscle wasting.  No gross arthritic changes. \b Neurologic: Alert and oriented.  Cranial nerves grossly intact.  No asterixsis.     Labs Reviewed:    [address] [place] [address] Units 5/2/2017 11/1/2016   WBC 3.4 - 10.8 x10E3/uL 5.1 5.1   ANC 1.4 - 7.0 x10E3/uL 2.9 2.8   HGB 12.6 - 17.7 g/dL 13.6 12.9 (L)    - 379 x10E3/uL 182 227   INR 0.8 - 1.2     2.0 (H)   AST 0 - 40 IU/L 66 (H) 65 (H)   ALT 0 - 44 IU/L 72 (H) 103 (H)   Alk Phos 39 - 117 IU/L 79 56   Bili, Total 0.0 - 1.2 mg/dL 0.4 0.3   Bili, Direct 0.00 - 0.40 mg/dL 0.13 0.1   Albumin 3.5 - 4.8 g/dL 3.8 3.8   BUN 8 - 27 mg/dL 19 12   Creat 0.76 - 1.27 mg/dL 0.73 (L) 0.98   Na 134 - 144 mmol/L 142 143   K 3.5 - 5.2 mmol/L 4.0 4.4   Cl 96 - 106 mmol/L 102 105   CO2 18 - 29 mmol/L 24 27   Glucose 65 - 99 mg/dL 110 (H) 135 (H)    \pard\jtrr2Iwloij Screening Latest Ref Rng & Units 5/2/2017 11/1/2016 8/9/2016   AFP, Serum 0.0 - 8.0 ng/mL 79.8 (H) 5.0 4.6   AFP-L3% 0.0 - 9.9 % 12.4 (H) 8.5 Comment    \pardSEROLOGIES:/2012.  HBA1C 6.7\'a0\pard\htpo3Ryiguvizba Latest Ref Rng 5/19/2015 12/5/2012   Hep A Ab, Total Negative   Positive (A)   Hep B Surface Ag Negative   Negative   Hep B Core Ab, Total Negative   Positive (A)   Hep B Surface Ab 0.00 - 0.99 Index Value   0.15   Hep C Genotype   2 CANCELED   HCV RT-PCR, Quant   [id number]     IL28B Genotype     TT genotype    /2015.  HCV RNA Positive/2015.  HCV RNA undetectable/2015.  HCV RNA log 6.7 intU/2016.  HCV RNA Log 6.8 intU/2016.  HCV RNA positive/2016.  Undetectable/2016.  112 intU/2017.  HCV RNA Log 7 intU, HCV genotype 2\pard \b LIVER HISTOLOGY:/2015.  FibroScan performed at [address]. EkPa was 18.8.  Suggested fibrosis level is F4.\'a0\b ENDOSCOPIC PROCEDURES:/2015.  EGD by Dr. Tesha Paredes.  Small esophageal varices.  No banding performed.  Repeat in 2 years.   \b  \b RADIOLOGY:/2012.  Ultrasound of liver.   Echogenic consistent with cirrhosis.  4.5x3.6 cm right lobe complex mass lesion.  No dilated bile ducts.  No ascites./2012.  CT scan abdomen with and without IV contrast.  Evidence of prior right lobe resection.  The liver otherwise appears normal.  No liver mass lesions.  No dilated bile ducts.  No bile duct strictures.  No ascites./2015.  Ultrasound of liver.  Echogenic consistent with chronic liver disease.  No liver mass lesions.  No dilated bile ducts.  No ascites. Post surgical changes./2016.  Ultrasound of liver.   Echogenic consistent with cirrhosis.  No liver mass lesions.  No dilated bile ducts.  No ascites. liver as above. Assessment/Plan     66 y.o.  male with cirrhosis secondary to chronic HCV and recently discovered possibly infiltrating HCC of the right lobe as above. CT guided core needle Bx of the right lobe lesion will be done first at [facility] as well as CT liver tumor protocol ( Y90 preoperative planning). If Bx is positive for [address] patient will be schedule for Y90 TheraSphere right hepatic lobe radioembolization treatment as long as liver synthetic fraction, patient vascular anatomy and lung shunt fraction permit. Risks and complications of radioembolization as well as liver biopsy were discussed in detail with patient as well as patients wife. Risks of bleeding, infection, poor tissue yield requiring repeat of the biopsy. Risks of poor tumor response and tumor progression in the intrahepatic/extrahepatic fashion.  Risks of radiation induced pneumonitis, gastroenteritis and hepatitis, which may, in rare instances result in acute liver failure and mortality.

## 2017-08-17 ENCOUNTER — APPOINTMENT (OUTPATIENT)
Dept: CT IMAGING | Age: 72
End: 2017-08-17
Attending: RADIOLOGY
Payer: MEDICARE

## 2017-08-17 ENCOUNTER — HOSPITAL ENCOUNTER (OUTPATIENT)
Dept: CT IMAGING | Age: 72
Discharge: HOME | End: 2017-08-17
Attending: RADIOLOGY | Admitting: RADIOLOGY
Payer: MEDICARE

## 2017-08-17 VITALS
SYSTOLIC BLOOD PRESSURE: 110 MMHG | HEART RATE: 62 BPM | BODY MASS INDEX: 37.91 KG/M2 | OXYGEN SATURATION: 97 % | TEMPERATURE: 97 F | DIASTOLIC BLOOD PRESSURE: 72 MMHG | WEIGHT: 264.8 LBS | HEIGHT: 70 IN | RESPIRATION RATE: 14 BRPM

## 2017-08-17 DIAGNOSIS — C22.0 HEPATOCELLULAR CARCINOMA (HCC): ICD-10-CM

## 2017-08-17 LAB
ALBUMIN SERPL-MCNC: 3.5 G/DL (ref 3.4–5)
ALBUMIN/GLOB SERPL: 0.7 {RATIO} (ref 0.8–1.7)
ALP SERPL-CCNC: 86 U/L (ref 45–117)
ALT SERPL-CCNC: 94 U/L (ref 16–61)
ANION GAP SERPL CALC-SCNC: 8 MMOL/L (ref 3–18)
APTT PPP: 25.2 SEC (ref 23–36.4)
AST SERPL-CCNC: 68 U/L (ref 15–37)
BILIRUB DIRECT SERPL-MCNC: 0.2 MG/DL (ref 0–0.2)
BILIRUB SERPL-MCNC: 0.4 MG/DL (ref 0.2–1)
BUN SERPL-MCNC: 22 MG/DL (ref 7–18)
BUN/CREAT SERPL: 20 (ref 12–20)
CALCIUM SERPL-MCNC: 9.3 MG/DL (ref 8.5–10.1)
CHLORIDE SERPL-SCNC: 103 MMOL/L (ref 100–108)
CO2 SERPL-SCNC: 29 MMOL/L (ref 21–32)
CREAT SERPL-MCNC: 1.11 MG/DL (ref 0.6–1.3)
ERYTHROCYTE [DISTWIDTH] IN BLOOD BY AUTOMATED COUNT: 13.1 % (ref 11.6–14.5)
GLOBULIN SER CALC-MCNC: 4.9 G/DL (ref 2–4)
GLUCOSE BLD STRIP.AUTO-MCNC: 66 MG/DL (ref 70–110)
GLUCOSE SERPL-MCNC: 106 MG/DL (ref 74–99)
HCT VFR BLD AUTO: 42.9 % (ref 36–48)
HGB BLD-MCNC: 14.4 G/DL (ref 13–16)
INR PPP: 1 (ref 0.8–1.2)
MCH RBC QN AUTO: 34.3 PG (ref 24–34)
MCHC RBC AUTO-ENTMCNC: 33.6 G/DL (ref 31–37)
MCV RBC AUTO: 102.1 FL (ref 74–97)
PLATELET # BLD AUTO: 157 K/UL (ref 135–420)
PMV BLD AUTO: 10.1 FL (ref 9.2–11.8)
POTASSIUM SERPL-SCNC: 3.9 MMOL/L (ref 3.5–5.5)
PROT SERPL-MCNC: 8.4 G/DL (ref 6.4–8.2)
PROTHROMBIN TIME: 12.6 SEC (ref 11.5–15.2)
RBC # BLD AUTO: 4.2 M/UL (ref 4.7–5.5)
SODIUM SERPL-SCNC: 140 MMOL/L (ref 136–145)
WBC # BLD AUTO: 5.6 K/UL (ref 4.6–13.2)

## 2017-08-17 PROCEDURE — 88333 PATH CONSLTJ SURG CYTO XM 1: CPT | Performed by: RADIOLOGY

## 2017-08-17 PROCEDURE — 82962 GLUCOSE BLOOD TEST: CPT

## 2017-08-17 PROCEDURE — 74170 CT ABD WO CNTRST FLWD CNTRST: CPT

## 2017-08-17 PROCEDURE — 74011250636 HC RX REV CODE- 250/636: Performed by: RADIOLOGY

## 2017-08-17 PROCEDURE — 47000 NEEDLE BIOPSY OF LIVER PERQ: CPT

## 2017-08-17 PROCEDURE — 74011250636 HC RX REV CODE- 250/636

## 2017-08-17 PROCEDURE — 88313 SPECIAL STAINS GROUP 2: CPT | Performed by: RADIOLOGY

## 2017-08-17 PROCEDURE — 88307 TISSUE EXAM BY PATHOLOGIST: CPT | Performed by: RADIOLOGY

## 2017-08-17 PROCEDURE — 80076 HEPATIC FUNCTION PANEL: CPT | Performed by: RADIOLOGY

## 2017-08-17 PROCEDURE — 85730 THROMBOPLASTIN TIME PARTIAL: CPT | Performed by: RADIOLOGY

## 2017-08-17 PROCEDURE — 85610 PROTHROMBIN TIME: CPT | Performed by: RADIOLOGY

## 2017-08-17 PROCEDURE — 74011000272 HC RX REV CODE- 272

## 2017-08-17 PROCEDURE — 88334 PATH CONSLTJ SURG CYTO XM EA: CPT | Performed by: RADIOLOGY

## 2017-08-17 PROCEDURE — 85027 COMPLETE CBC AUTOMATED: CPT | Performed by: RADIOLOGY

## 2017-08-17 PROCEDURE — 80048 BASIC METABOLIC PNL TOTAL CA: CPT | Performed by: RADIOLOGY

## 2017-08-17 PROCEDURE — 74011636320 HC RX REV CODE- 636/320: Performed by: RADIOLOGY

## 2017-08-17 RX ORDER — FENTANYL CITRATE 50 UG/ML
INJECTION, SOLUTION INTRAMUSCULAR; INTRAVENOUS
Status: COMPLETED
Start: 2017-08-17 | End: 2017-08-17

## 2017-08-17 RX ORDER — FENTANYL CITRATE 50 UG/ML
50 INJECTION, SOLUTION INTRAMUSCULAR; INTRAVENOUS
Status: DISCONTINUED | OUTPATIENT
Start: 2017-08-17 | End: 2017-08-17 | Stop reason: HOSPADM

## 2017-08-17 RX ORDER — MIDAZOLAM HYDROCHLORIDE 1 MG/ML
1 INJECTION, SOLUTION INTRAMUSCULAR; INTRAVENOUS
Status: DISCONTINUED | OUTPATIENT
Start: 2017-08-17 | End: 2017-08-17 | Stop reason: HOSPADM

## 2017-08-17 RX ORDER — SODIUM CHLORIDE 9 MG/ML
20 INJECTION, SOLUTION INTRAVENOUS CONTINUOUS
Status: DISCONTINUED | OUTPATIENT
Start: 2017-08-17 | End: 2017-08-17 | Stop reason: HOSPADM

## 2017-08-17 RX ORDER — NALOXONE HYDROCHLORIDE 0.4 MG/ML
0.1 INJECTION, SOLUTION INTRAMUSCULAR; INTRAVENOUS; SUBCUTANEOUS
Status: DISCONTINUED | OUTPATIENT
Start: 2017-08-17 | End: 2017-08-17 | Stop reason: HOSPADM

## 2017-08-17 RX ORDER — MIDAZOLAM HYDROCHLORIDE 1 MG/ML
INJECTION, SOLUTION INTRAMUSCULAR; INTRAVENOUS
Status: COMPLETED
Start: 2017-08-17 | End: 2017-08-17

## 2017-08-17 RX ORDER — SODIUM CHLORIDE 0.9 % (FLUSH) 0.9 %
5-10 SYRINGE (ML) INJECTION EVERY 8 HOURS
Status: DISCONTINUED | OUTPATIENT
Start: 2017-08-17 | End: 2017-08-17 | Stop reason: HOSPADM

## 2017-08-17 RX ORDER — FLUMAZENIL 0.1 MG/ML
0.2 INJECTION INTRAVENOUS
Status: DISCONTINUED | OUTPATIENT
Start: 2017-08-17 | End: 2017-08-17 | Stop reason: HOSPADM

## 2017-08-17 RX ORDER — SODIUM CHLORIDE 0.9 % (FLUSH) 0.9 %
5-10 SYRINGE (ML) INJECTION AS NEEDED
Status: DISCONTINUED | OUTPATIENT
Start: 2017-08-17 | End: 2017-08-17 | Stop reason: HOSPADM

## 2017-08-17 RX ADMIN — IOPAMIDOL 100 ML: 755 INJECTION, SOLUTION INTRAVENOUS at 08:18

## 2017-08-17 RX ADMIN — FENTANYL CITRATE 50 MCG: 50 INJECTION, SOLUTION INTRAMUSCULAR; INTRAVENOUS at 09:19

## 2017-08-17 RX ADMIN — GELATIN ABSORBABLE SPONGE 12-7 MM 1 EACH: 12-7 MISC at 09:36

## 2017-08-17 RX ADMIN — MIDAZOLAM HYDROCHLORIDE 1 MG: 1 INJECTION, SOLUTION INTRAMUSCULAR; INTRAVENOUS at 09:19

## 2017-08-17 RX ADMIN — FENTANYL CITRATE 25 MCG: 50 INJECTION, SOLUTION INTRAMUSCULAR; INTRAVENOUS at 09:34

## 2017-08-17 RX ADMIN — FENTANYL CITRATE 50 MCG: 50 INJECTION, SOLUTION INTRAMUSCULAR; INTRAVENOUS at 09:24

## 2017-08-17 RX ADMIN — MIDAZOLAM HYDROCHLORIDE 1 MG: 1 INJECTION, SOLUTION INTRAMUSCULAR; INTRAVENOUS at 09:24

## 2017-08-17 RX ADMIN — FENTANYL CITRATE 50 MCG: 50 INJECTION INTRAMUSCULAR; INTRAVENOUS at 09:19

## 2017-08-17 RX ADMIN — SODIUM CHLORIDE 20 ML/HR: 900 INJECTION, SOLUTION INTRAVENOUS at 07:52

## 2017-08-17 NOTE — PROGRESS NOTES
TRANSFER - OUT REPORT:    Verbal report given to Bianca Sherman RN(name) on Angelia Ashely.  being transferred to Phase 2(unit) for routine post - op       Report consisted of patients Situation, Background, Assessment and   Recommendations(SBAR). Information from the following report(s) SBAR, Kardex and MAR was reviewed with the receiving nurse. Lines:   Peripheral IV 12/21/12 Left Antecubital (Active)       Peripheral IV 08/17/17 Left Hand (Active)   Site Assessment Clean, dry, & intact 8/17/2017  7:50 AM   Dressing Status Clean, dry, & intact 8/17/2017  7:50 AM   Hub Color/Line Status Pink; Infusing 8/17/2017  7:50 AM   Alcohol Cap Used No 8/17/2017  7:50 AM        Opportunity for questions and clarification was provided.       Patient transported with:   [employer]

## 2017-08-17 NOTE — PROCEDURES
RADIOLOGY POST PROCEDURE NOTE     August 17, 2017       10:18 AM     Preoperative Diagnosis:   Liver mass. Postoperative Diagnosis:  Same. :  Dr. Raine Dorantes    Assistant:  None. Type of Anesthesia: 1% plain lidocaine and IV moderate sedation with Versed and Fentanyl    Procedure/Description:  CT guided right hepatic lobe segment 7 core needle Bx    Findings:   No bleeding. Estimated blood Loss:  Minimal    Specimen Removed:   yes    Blood transfusions:  None. Implants:  None.     Complications: None    Condition: Stable    Discharge Plan:  discharge home     Nehal Devi MD

## 2017-08-17 NOTE — DISCHARGE INSTRUCTIONS
DISCHARGE SUMMARY from Nurse    The following personal items are in your possession at time of discharge:    Dental Appliances: None        Home Medications: None  Jewelry: None  Clothing: Pants, Shirt, Footwear, Undergarments  Other Valuables: None     PATIENT INSTRUCTIONS:    After general anesthesia or intravenous sedation, for 24 hours or while taking prescription Narcotics:  · Limit your activities  · Do not drive and operate hazardous machinery  · Do not make important personal or business decisions  · Do  not drink alcoholic beverages  · If you have not urinated within 8 hours after discharge, please contact your surgeon on call. Report the following to your surgeon:  · Excessive pain, swelling, redness or odor of or around the surgical area  · Temperature over 100.5  · Nausea and vomiting lasting longer than 4 hours or if unable to take medications  · Any signs of decreased circulation or nerve impairment to extremity: change in color, persistent  numbness, tingling, coldness or increase pain  · Any questions        What to do at Home:  Recommended activity: Activity as tolerated and no driving for today and No heavy lifting, pushing, pulling until cleared by your doctor. *  Please give a list of your current medications to your Primary Care Provider. *  Please update this list whenever your medications are discontinued, doses are      changed, or new medications (including over-the-counter products) are added. *  Please carry medication information at all times in case of emergency situations. These are general instructions for a healthy lifestyle:    No smoking/ No tobacco products/ Avoid exposure to second hand smoke    Surgeon General's Warning:  Quitting smoking now greatly reduces serious risk to your health.     Obesity, smoking, and sedentary lifestyle greatly increases your risk for illness    A healthy diet, regular physical exercise & weight monitoring are important for maintaining a healthy lifestyle    You may be retaining fluid if you have a history of heart failure or if you experience any of the following symptoms:  Weight gain of 3 pounds or more overnight or 5 pounds in a week, increased swelling in our hands or feet or shortness of breath while lying flat in bed. Please call your doctor as soon as you notice any of these symptoms; do not wait until your next office visit. Recognize signs and symptoms of STROKE:    F-face looks uneven    A-arms unable to move or move unevenly    S-speech slurred or non-existent    T-time-call 911 as soon as signs and symptoms begin-DO NOT go       Back to bed or wait to see if you get better-TIME IS BRAIN. Warning Signs of HEART ATTACK     Call 911 if you have these symptoms:   Chest discomfort. Most heart attacks involve discomfort in the center of the chest that lasts more than a few minutes, or that goes away and comes back. It can feel like uncomfortable pressure, squeezing, fullness, or pain.  Discomfort in other areas of the upper body. Symptoms can include pain or discomfort in one or both arms, the back, neck, jaw, or stomach.  Shortness of breath with or without chest discomfort.  Other signs may include breaking out in a cold sweat, nausea, or lightheadedness. Don't wait more than five minutes to call 911 - MINUTES MATTER! Fast action can save your life. Calling 911 is almost always the fastest way to get lifesaving treatment. Emergency Medical Services staff can begin treatment when they arrive -- up to an hour sooner than if someone gets to the hospital by car. Percutaneous Liver Biopsy: What to Expect at [facility]  Percutaneous liver biopsy is a procedure to take a tiny sample (biopsy) of your liver tissue. Percutaneous (say \"per-kew-SHANA-nee-us) means \"through the skin. \" The procedure is also called aspiration biopsy or fine-needle aspiration. The tissue sample is looked at under a microscope.  Your doctor can look for infection or other liver problems. You may have some pain where the biopsy needle entered your skin (the puncture site). You may also have pain in your shoulder. This is called referred pain. It is caused by pain traveling along a nerve near the biopsy site. The referred pain usually lasts less than 12 hours. You may have a small amount of bleeding from the puncture site. You will need to take it easy at home for 1 or 2 days after the procedure. You will probably be able to return to work and most of your usual activities after that. This care sheet gives you a general idea about how long it will take for you to recover. But each person recovers at a different pace. Follow the steps below to get better as quickly as possible. How can you care for yourself at home? Activity  · Rest when you feel tired. Getting enough sleep will help you recover. · Try to walk each day. Start by walking a little more than you did the day before. Bit by bit, increase the amount you walk. Walking boosts blood flow and helps prevent pneumonia and constipation. · Avoid exercises that use your belly muscles and strenuous activities, such as bicycle riding, jogging, weight lifting, or aerobic exercise, for 1 week or until your doctor says it is okay. · Ask your doctor when you can drive again. · You will probably need to take 1 or 2 days off from work. It depends on the type of work you do and how you feel. · You will probably be able to shower the same day as the test, if your doctor says it is okay. Pat the puncture site dry. Do not take a bath for at least 2 days after the test, or until your doctor tells you it is okay. Diet  · You can eat your normal diet. If your stomach is upset, try bland, low-fat foods like plain rice, broiled chicken, toast, and yogurt. · Drink plenty of fluids (unless your doctor tells you not to). Medicines  · Your doctor will tell you if and when you can restart your medicines.  He or she will also give you instructions about taking any new medicines. · If you take blood thinners, such as warfarin (Coumadin), clopidogrel (Plavix), or aspirin, be sure to talk to your doctor. He or she will tell you if and when to start taking those medicines again. Make sure that you understand exactly what your doctor wants you to do. · Be safe with medicines. Take pain medicines exactly as directed. ¨ If the doctor gave you a prescription medicine for pain, take it as prescribed. ¨ If you are not taking a prescription pain medicine, take an over-the-counter medicine that your doctor recommends. Read and follow all instructions on the label. ¨ Do not take aspirin, ibuprofen (Advil, Motrin), naproxen (Aleve), or other nonsteroidal anti-inflammatory drugs (NSAIDs) unless your doctor says it is okay. · If you think your pain medicine is making you sick to your stomach:  ¨ Take your medicine after meals (unless your doctor has told you not to). ¨ Ask your doctor for a different kind of pain medicine. Care of the puncture site  · Keep a bandage over the puncture site for the first 1 or 2 days. Follow-up care is a key part of your treatment and safety. Be sure to make and go to all appointments, and call your doctor if you are having problems. It's also a good idea to know your test results and keep a list of the medicines you take. When should you call for help? Call 911 anytime you think you may need emergency care. For example, call if:  · You passed out (lost consciousness). · You have severe trouble breathing. · You have sudden chest pain and shortness of breath, or you cough up blood. · You have severe pain in your chest, shoulder, or belly. Call your doctor now or seek immediate medical care if:  · You have new or worse shortness of breath. · Bright red blood has soaked through the bandage over the puncture site. · You have pain that does not get better after you take your pain medicine.   · You are sick to your stomach or cannot keep fluids down. · You have a fever, chills, or body aches. · You have signs of infection, such as:  ¨ Increased pain, swelling, warmth, or redness. ¨ Red streaks leading from the puncture site. ¨ Pus draining from the puncture site. ¨ A fever. · You have new or worse pain at the puncture site. · You have new or worse belly swelling or bloating. · You have trouble passing urine or stool. · Your stools are black and tarlike or have streaks of blood. · You have pale-colored stools along with dark urine and itching. Watch closely for changes in your health, and be sure to contact your doctor if you have any problems. Where can you learn more? Go to http://enrique-enmanuel.info/. Enter E107 in the search box to learn more about \"Percutaneous Liver Biopsy: What to Expect at Home. \"  Current as of: October 14, 2016  Content Version: 11.3  © 9071-3503 [employer], [employer]. Care instructions adapted under license by [employer] (which disclaims liability or warranty for this information). If you have questions about a medical condition or this instruction, always ask your healthcare professional. [place] [address] any warranty or liability for your use of this information. The discharge information has been reviewed with the patient and spouse. The patient and spouse verbalized understanding. Discharge medications reviewed with the patient and spouse and appropriate educational materials and side effects teaching were provided.

## 2017-08-17 NOTE — IP AVS SNAPSHOT
Myke Christensen 
 
 
 [address] [place] [address] Patient: Lenore Vela. MRN: [id number] You are allergic to the following No active allergies Recent Documentation Height Weight BMI Smoking Status 1.778 m 120.1 kg 37.99 kg/m2 Former Smoker Emergency Contacts Name Discharge Info Relation Home Work Mobile Angelina Flores DISCHARGE CAREGIVER [3] Spouse [3] [phone] Chito Flores  Spouse [3] [phone] About your hospitalization You were admitted on:  August 17, 2017 [facility] You were discharged on:  August 17, 2017 Unit phone number:  [phone] Why you were hospitalized Your primary diagnosis was:  Not on File Providers Seen During Your Hospitalizations Provider Role Specialty Primary office phone David Silvestre MD Attending Provider Radiology [phone] Your Primary Care Physician (PCP) Primary Care Physician Office Phone Office Fax Fanny Lindsey [phone] Follow-up Information Follow up With Details Comments Contact Info Liz Infante MD   [address] [facility] [id number] [phone] Your Appointments Wednesday September 06, 2017  1:30 PM EDT Follow Up with Mel Garza NP [facility] ([address])  
 [address] [place] [address]  
[phone] Current Discharge Medication List  
  
CONTINUE these medications which have NOT CHANGED Dose & Instructions Dispensing Information Comments Morning Noon Evening Bedtime COUMADIN 5 mg tablet Generic drug:  warfarin Your last dose was: Your next dose is:    
   
   
 Dose:  7.5 mg Take 7.5 mg by mouth daily. Refills:  0 furosemide 40 mg tablet Commonly known as:  LASIX Your last dose was: Your next dose is: Take  by mouth daily. Refills:  0  
     
   
   
   
  
 glimepiride 2 mg tablet Commonly known as:  AMARYL Your last dose was: Your next dose is: Take  by mouth every morning. Refills:  0  
     
   
   
   
  
 lisinopril 5 mg tablet Commonly known as:  aIn Desmarylutis Your last dose was: Your next dose is: Take  by mouth daily. Indications: HYPERTENSION Refills:  0  
     
   
   
   
  
 metFORMIN 500 mg tablet Commonly known as:  GLUCOPHAGE Your last dose was: Your next dose is: Take  by mouth daily (with breakfast). Refills:  0  
     
   
   
   
  
 metoprolol succinate 25 mg XL tablet Commonly known as:  TOPROL-XL Your last dose was: Your next dose is:    
   
   
 TK 1 T PO D Refills:  3  
     
   
   
   
  
 oxyCODONE-acetaminophen  mg per tablet Commonly known as:  PERCOCET 10 Your last dose was: Your next dose is:    
   
   
 Dose:  1 Tab Take 1 Tab by mouth every eight (8) hours as needed for Pain. Max Daily Amount: 3 Tabs. Quantity:  90 Tab Refills:  0  
     
   
   
   
  
 pioglitazone 30 mg tablet Commonly known as:  ACTOS Your last dose was: Your next dose is:    
   
   
 TK 1 T PO  D Refills:  2  
     
   
   
   
  
 * potassium chloride SR 10 mEq tablet Commonly known as:  KLOR-CON 10 Your last dose was: Your next dose is: Take  by mouth Every Mon, Wed & Sun.  
 Refills:  0  
     
   
   
   
  
 * potassium chloride SA 10 mEq capsule Commonly known as:  Eliezer Cassis Your last dose was: Your next dose is:    
   
   
 TK 1 C PO D Refills:  2  
     
   
   
   
  
 spironolactone 25 mg tablet Commonly known as:  ALDACTONE Your last dose was: Your next dose is: Take  by mouth daily. Refills:  0  
     
   
   
   
  
 tamsulosin 0.4 mg capsule Commonly known as:  FLOMAX Your last dose was: Your next dose is:    
   
   
 Dose:  0.4 mg Take 0.4 mg by mouth daily. Refills:  0  
     
   
   
   
  
 * Notice: This list has 2 medication(s) that are the same as other medications prescribed for you. Read the directions carefully, and ask your doctor or other care provider to review them with you. Discharge Instructions DISCHARGE SUMMARY from Nurse The following personal items are in your possession at time of discharge: 
 
Dental Appliances: None Home Medications: None Jewelry: None Clothing: Pants, Shirt, Footwear, Undergarments Other Valuables: None PATIENT INSTRUCTIONS: 
 
After general anesthesia or intravenous sedation, for 24 hours or while taking prescription Narcotics: · Limit your activities · Do not drive and operate hazardous machinery · Do not make important personal or business decisions · Do  not drink alcoholic beverages · If you have not urinated within 8 hours after discharge, please contact your surgeon on call. Report the following to your surgeon: 
· Excessive pain, swelling, redness or odor of or around the surgical area · Temperature over 100.5 · Nausea and vomiting lasting longer than 4 hours or if unable to take medications · Any signs of decreased circulation or nerve impairment to extremity: change in color, persistent  numbness, tingling, coldness or increase pain · Any questions What to do at Home: 
Recommended activity: Activity as tolerated and no driving for today and No heavy lifting, pushing, pulling until cleared by your doctor. *  Please give a list of your current medications to your Primary Care Provider.  
 
*  Please update this list whenever your medications are discontinued, doses are 
 changed, or new medications (including over-the-counter products) are added. *  Please carry medication information at all times in case of emergency situations. These are general instructions for a healthy lifestyle: No smoking/ No tobacco products/ Avoid exposure to second hand smoke Surgeon General's Warning:  Quitting smoking now greatly reduces serious risk to your health. Obesity, smoking, and sedentary lifestyle greatly increases your risk for illness A healthy diet, regular physical exercise & weight monitoring are important for maintaining a healthy lifestyle You may be retaining fluid if you have a history of heart failure or if you experience any of the following symptoms:  Weight gain of 3 pounds or more overnight or 5 pounds in a week, increased swelling in our hands or feet or shortness of breath while lying flat in bed. Please call your doctor as soon as you notice any of these symptoms; do not wait until your next office visit. Recognize signs and symptoms of STROKE: 
 
F-face looks uneven A-arms unable to move or move unevenly S-speech slurred or non-existent T-time-call 911 as soon as signs and symptoms begin-DO NOT go Back to bed or wait to see if you get better-TIME IS BRAIN. Warning Signs of HEART ATTACK Call 911 if you have these symptoms: 
? Chest discomfort. Most heart attacks involve discomfort in the center of the chest that lasts more than a few minutes, or that goes away and comes back. It can feel like uncomfortable pressure, squeezing, fullness, or pain. ? Discomfort in other areas of the upper body. Symptoms can include pain or discomfort in one or both arms, the back, neck, jaw, or stomach. ? Shortness of breath with or without chest discomfort. ? Other signs may include breaking out in a cold sweat, nausea, or lightheadedness. Don't wait more than five minutes to call [address]!  Fast action can save your life. Calling 911 is almost always the fastest way to get lifesaving treatment. Emergency Medical Services staff can begin treatment when they arrive  up to an hour sooner than if someone gets to the hospital by car. Percutaneous Liver Biopsy: What to Expect at Home Your Recovery Percutaneous liver biopsy is a procedure to take a tiny sample (biopsy) of your liver tissue. Percutaneous (say \"per-kew-SHANA-nee-us) means \"through the skin. \" The procedure is also called aspiration biopsy or fine-needle aspiration. The tissue sample is looked at under a microscope. Your doctor can look for infection or other liver problems. You may have some pain where the biopsy needle entered your skin (the puncture site). You may also have pain in your shoulder. This is called referred pain. It is caused by pain traveling along a nerve near the biopsy site. The referred pain usually lasts less than 12 hours. You may have a small amount of bleeding from the puncture site. You will need to take it easy at home for 1 or 2 days after the procedure. You will probably be able to return to work and most of your usual activities after that. This care sheet gives you a general idea about how long it will take for you to recover. But each person recovers at a different pace. Follow the steps below to get better as quickly as possible. How can you care for yourself at home? Activity · Rest when you feel tired. Getting enough sleep will help you recover. · Try to walk each day. Start by walking a little more than you did the day before. Bit by bit, increase the amount you walk. Walking boosts blood flow and helps prevent pneumonia and constipation. · Avoid exercises that use your belly muscles and strenuous activities, such as bicycle riding, jogging, weight lifting, or aerobic exercise, for 1 week or until your doctor says it is okay. · Ask your doctor when you can drive again. · You will probably need to take 1 or 2 days off from work. It depends on the type of work you do and how you feel. · You will probably be able to shower the same day as the test, if your doctor says it is okay. Pat the puncture site dry. Do not take a bath for at least 2 days after the test, or until your doctor tells you it is okay. Diet · You can eat your normal diet. If your stomach is upset, try bland, low-fat foods like plain rice, broiled chicken, toast, and yogurt. · Drink plenty of fluids (unless your doctor tells you not to). Medicines · Your doctor will tell you if and when you can restart your medicines. He or she will also give you instructions about taking any new medicines. · If you take blood thinners, such as warfarin (Coumadin), clopidogrel (Plavix), or aspirin, be sure to talk to your doctor. He or she will tell you if and when to start taking those medicines again. Make sure that you understand exactly what your doctor wants you to do. · Be safe with medicines. Take pain medicines exactly as directed. ¨ If the doctor gave you a prescription medicine for pain, take it as prescribed. ¨ If you are not taking a prescription pain medicine, take an over-the-counter medicine that your doctor recommends. Read and follow all instructions on the label. ¨ Do not take aspirin, ibuprofen (Advil, Motrin), naproxen (Aleve), or other nonsteroidal anti-inflammatory drugs (NSAIDs) unless your doctor says it is okay. · If you think your pain medicine is making you sick to your stomach: 
¨ Take your medicine after meals (unless your doctor has told you not to). ¨ Ask your doctor for a different kind of pain medicine. Care of the puncture site · Keep a bandage over the puncture site for the first 1 or 2 days. Follow-up care is a key part of your treatment and safety.  Be sure to make and go to all appointments, and call your doctor if you are having problems. It's also a good idea to know your test results and keep a list of the medicines you take. When should you call for help? Call 911 anytime you think you may need emergency care. For example, call if: 
· You passed out (lost consciousness). · You have severe trouble breathing. · You have sudden chest pain and shortness of breath, or you cough up blood. · You have severe pain in your chest, shoulder, or belly. Call your doctor now or seek immediate medical care if: 
· You have new or worse shortness of breath. · Bright red blood has soaked through the bandage over the puncture site. · You have pain that does not get better after you take your pain medicine. · You are sick to your stomach or cannot keep fluids down. · You have a fever, chills, or body aches. · You have signs of infection, such as: 
¨ Increased pain, swelling, warmth, or redness. ¨ Red streaks leading from the puncture site. ¨ Pus draining from the puncture site. ¨ A fever. · You have new or worse pain at the puncture site. · You have new or worse belly swelling or bloating. · You have trouble passing urine or stool. · Your stools are black and tarlike or have streaks of blood. · You have pale-colored stools along with dark urine and itching. Watch closely for changes in your health, and be sure to contact your doctor if you have any problems. Where can you learn more? Go to http://enrique-enmanuel.info/. Enter Y269 in the search box to learn more about \"Percutaneous Liver Biopsy: What to Expect at Home. \" Current as of: October 14, 2016 Content Version: 11.3 © 3062-3587 [employer]. Care instructions adapted under license by [employer] (which disclaims liability or warranty for this information).  If you have questions about a medical condition or this instruction, always ask your healthcare professional. [employer] disclaims any warranty or liability for your use of this information. The discharge information has been reviewed with the patient and spouse. The patient and spouse verbalized understanding. Discharge medications reviewed with the patient and spouse and appropriate educational materials and side effects teaching were provided. Discharge Orders None Introducing [facility]! Dear Dafne Shanks: 
Thank you for requesting a [employer] account. Our records indicate that you already have an active [employer] account. You can access your account anytime at [email or web address] Did you know that you can access your hospital and ER discharge instructions at any time in [employer]? You can also review all of your test results from your hospital stay or ER visit. Additional Information If you have questions, please visit the Frequently Asked Questions section of the [employer] website at [email or web address] Remember, [employer] is NOT to be used for urgent needs. For medical emergencies, dial 911. Now available from your iPhone and Android! General Information Please provide this summary of care documentation to your next provider. Patient Signature:  ____________________________________________________________ Date:  ____________________________________________________________  
  
Miladys Goss Provider Signature:  ____________________________________________________________ Date:  ____________________________________________________________

## 2017-08-21 ENCOUNTER — TELEPHONE (OUTPATIENT)
Dept: HEMATOLOGY | Age: 72
End: 2017-08-21

## 2017-08-21 NOTE — TELEPHONE ENCOUNTER
Pt. States he would like results on recent procedure that was done!        Please Advise        Olivier Hernandez

## 2017-09-06 ENCOUNTER — OFFICE VISIT (OUTPATIENT)
Dept: HEMATOLOGY | Age: 72
End: 2017-09-06

## 2017-09-06 ENCOUNTER — HOSPITAL ENCOUNTER (OUTPATIENT)
Dept: LAB | Age: 72
Discharge: HOME | End: 2017-09-06

## 2017-09-06 VITALS
DIASTOLIC BLOOD PRESSURE: 73 MMHG | TEMPERATURE: 97.3 F | WEIGHT: 269 LBS | HEIGHT: 70 IN | OXYGEN SATURATION: 97 % | HEART RATE: 84 BPM | SYSTOLIC BLOOD PRESSURE: 131 MMHG | BODY MASS INDEX: 38.51 KG/M2 | RESPIRATION RATE: 18 BRPM

## 2017-09-06 DIAGNOSIS — C22.0 HEPATOCELLULAR CARCINOMA (HCC): Primary | ICD-10-CM

## 2017-09-06 PROCEDURE — 99001 SPECIMEN HANDLING PT-LAB: CPT | Performed by: NURSE PRACTITIONER

## 2017-09-06 RX ORDER — NALOXONE HYDROCHLORIDE 4 MG/.1ML
SPRAY NASAL
Qty: 2 PACKET | Refills: 1 | Status: SHIPPED | OUTPATIENT
Start: 2017-09-06 | End: 2018-06-18 | Stop reason: SDUPTHER

## 2017-09-06 RX ORDER — HYDROMORPHONE HYDROCHLORIDE 4 MG/1
4 TABLET ORAL
Qty: 60 TAB | Refills: 0 | Status: SHIPPED | OUTPATIENT
Start: 2017-09-06 | End: 2017-11-08 | Stop reason: ALTCHOICE

## 2017-09-06 NOTE — MR AVS SNAPSHOT
Visit Information Date & Time Provider Department Dept. Phone Encounter #  
 9/6/2017  1:30 PM Rosa Dang NP [facility] of  [address] [id number] Follow-up Instructions Return in about 8 weeks (around 11/1/2017). Upcoming Health Maintenance Date Due DTaP/Tdap/Td series (1 - Tdap) 10/18/1964 FOBT Q 1 YEAR AGE 50-75 10/18/1993 ZOSTER VACCINE AGE 60> 8/18/2003 GLAUCOMA SCREENING Q2Y 10/18/2008 Pneumococcal 65+ High/Highest Risk (1 of 2 - PCV13) 10/18/2008 MEDICARE YEARLY EXAM 10/18/2008 INFLUENZA AGE 9 TO ADULT 8/1/2017 Allergies as of 9/6/2017  Review Complete On: 9/6/2017 By: Rosa Dang NP No Known Allergies Current Immunizations  Never Reviewed No immunizations on file. Not reviewed this visit You Were Diagnosed With   
  
 Codes Comments [facility]    -  Primary ICD-10-CM: C22.0 ICD-9-CM: 155.0 Vitals BP Pulse Temp Resp Height(growth percentile) 131/73 (BP 1 Location: Right arm, BP Patient Position: Sitting) 84 97.3 °F (36.3 °C) (Tympanic) 18 5' 10\" (1.778 m) Weight(growth percentile) SpO2 BMI Smoking Status 269 lb (122 kg) 97% 38.6 kg/m2 Former Smoker Vitals History BMI and BSA Data Body Mass Index Body Surface Area  
 38.6 kg/m 2 2.45 m 2 Preferred Pharmacy Pharmacy Name Phone [address] [phone] Your Updated Medication List  
  
   
This list is accurate as of: 9/6/17  2:19 PM.  Always use your most recent med list.  
  
  
  
  
 COUMADIN 5 mg tablet Generic drug:  warfarin Take 7.5 mg by mouth daily. furosemide 40 mg tablet Commonly known as:  LASIX Take  by mouth daily. glimepiride 2 mg tablet Commonly known as:  AMARYL Take  by mouth every morning. HYDROmorphone 4 mg tablet Commonly known as:  DILAUDID Take 1 Tab by mouth every four (4) hours as needed for Pain. Max Daily Amount: 24 mg.  
  
 lisinopril 5 mg tablet Commonly known as:  Nakul Littler Take  by mouth daily. Indications: HYPERTENSION  
  
 metFORMIN 500 mg tablet Commonly known as:  GLUCOPHAGE Take  by mouth daily (with breakfast). metoprolol succinate 25 mg XL tablet Commonly known as:  TOPROL-XL TK 1 T PO D  
  
 naloxone 4 mg/actuation Spry Commonly known as:  [employer] Use 1 spray intranasally into 1 nostril. Use a new Narcan nasal spray for subsequent doses and administer into alternating nostrils. May repeat every 2 to 3 minutes as needed. pioglitazone 30 mg tablet Commonly known as:  ACTOS TK 1 T PO  D  
  
 * potassium chloride SR 10 mEq tablet Commonly known as:  KLOR-CON 10 Take  by mouth Every Mon, Wed & Sun.  
  
 * potassium chloride SA 10 mEq capsule Commonly known as:  MICRO-K  
TK 1 C PO D  
  
 spironolactone 25 mg tablet Commonly known as:  ALDACTONE Take  by mouth daily. tamsulosin 0.4 mg capsule Commonly known as:  FLOMAX Take 0.4 mg by mouth daily. * Notice: This list has 2 medication(s) that are the same as other medications prescribed for you. Read the directions carefully, and ask your doctor or other care provider to review them with you. Prescriptions Printed Refills HYDROmorphone (DILAUDID) 4 mg tablet 0 Sig: Take 1 Tab by mouth every four (4) hours as needed for Pain. Max Daily Amount: 24 mg. Class: Print Route: Oral  
 naloxone (NARCAN) 4 mg/actuation spry 1 Sig: Use 1 spray intranasally into 1 nostril. Use a new Narcan nasal spray for subsequent doses and administer into alternating nostrils. May repeat every 2 to 3 minutes as needed. Class: Print Follow-up Instructions Return in about 8 weeks (around 11/1/2017). To-Do List   
 09/15/2017 9:30 AM  
 Appointment with [facility] CATH HOLDING; HR RAD NO ANESTHESIA; [facility] ANGIO RADIOLOGIST; [facility] IR RM 1 at [facility] RAD [address] IR ([phone]) DIET INSTRUCTIONS - NPO - Nothing to eat or drink after midnight before the day of your exam.  You may take small sips of water with your medications unless instructed otherwise. GENERAL INSTRUCTIONS - You must have someone to drive you home after the procedure, unless instructed otherwise. MEDICATIONS - Bring a complete list of all your medications including prescriptions, over-the-counter meds, herbals, vitamins & dietary supplements. QUESTIONS Notify the Angio department in which your are scheduled. -Sanjuanita Dupree. [address] [facility] Jose Angel Chance Rojasisses [phone]  
  
 09/15/2017 11:00 AM  
  Appointment with [address] [place] [address] 1 at [facility]  [place] [facility] ([phone]) MEDICATIONS  - Patient must bring a complete list of all medications currently taking to include prescriptions, over-the-counter meds, herbals, vitamins & any dietary supplements  STUDY DETAILS  - This study is approximately 45  60 minutes in duration. Introducing [facility] HE[facility]! Dear Mary Cadena: 
Thank you for requesting a "[employer] account. Our records indicate that you already have an active "[employer] account. You can access your account anytime at https://[employer]. [employer] Did you know that you can access your hospital and ER discharge instructions at any time in "[employer]? You can also review all of your test results from your hospital stay or ER visit. Additional Information If you have questions, please visit the Frequently Asked Questions section of the "[employer] website at https://[email or web address]. [employer] Remember, "[employer] is NOT to be used for urgent needs. For medical emergencies, dial 911. Now available from your iPhone and Android! Please provide this summary of care documentation to your next provider. Your primary care clinician is listed as Mercedes Taylor. If you have any questions after today's visit, please call [phone].

## 2017-09-06 NOTE — PROGRESS NOTES
[address] Jarod Ross MD, [address], [place], [place]       Una Garcia, LINH Zaidi, [facility] Sarah, HANY Ty NP        at [facility]     [address] [facility], [address], [facility] [address]     [phone]     FAX: [phone]    at [facility]     [address], [address] [place] [address], [address]     [phone]     FAX: [phone]       Patient Care Team:  Shayna Aguilar MD as PCP - General (Unknown Physician Specialty)  Kevin Sahu MD (Gastroenterology)  Shayna Aguilar MD (Unknown Physician Specialty)      Problem List  Date Reviewed: 9/6/2017          Codes Class Noted    Hepatocellular carcinoma ([facility] ICD-10-CM: C22.0  ICD-9-CM: 155.0  9/6/2017        A-fib ([facility] ICD-10-CM: I48.91  ICD-9-CM: 427.31  11/30/2015        Cirrhosis ([facility] ICD-10-CM: K74.60  ICD-9-CM: 571.5  6/30/2015        Elevated prostate specific antigen (PSA) ICD-10-CM: R97.20  ICD-9-CM: 790.93  5/10/2014        H/O resection of liver ICD-10-CM: Z90.49  ICD-9-CM: V15.29  12/24/2012    Overview Signed 12/24/2012  1:22 AM by Jerri Avery MD     Partial right lobectomy. Reason for this is not known. Chronic hepatitis C ([facility] ICD-10-CM: B18.2  ICD-9-CM: 070.54  12/5/2012    Overview Addendum 6/30/2016  9:26 AM by Jerri Avery MD     Peginterferon/ribavirin ~6700. Treated 6 months.   SOF/RBV relapse             Hypertension ICD-10-CM: I10  ICD-9-CM: 401.9  12/5/2012        PAT (paroxysmal atrial tachycardia) ([facility] ICD-10-CM: I47.1  ICD-9-CM: 427.0  12/5/2012    Overview Signed 12/5/2012  2:12 PM by Jerri Avery MD     Ablation 2002             Colon cancer [facility]) ICD-10-CM: C18.9  ICD-9-CM: 153.9  12/5/2012    Overview Signed 12/5/2012  2:13 PM by Jerri Avery MD     Partial colectomy 2004             S/P knee surgery ICD-10-CM: W07.530  ICD-9-CM: V45.89  12/5/2012    Overview Signed 12/5/2012  2:14 PM by Alexis Flores MD     2010                   [facility] Johanny Waddell. returns to the [address] for management of cirrhosis secondary to chronic HCV. Mr. Quiana Hurd has a recently discovered liver cancer. The active problem list, all pertinent past medical history, medications, liver histology, endoscopic studies, radiologic findings and laboratory findings related to the liver disorder were reviewed with the patient. The patient is a 66 y.o.  male who first found out he had HCV in the early 2000s when he was living in [place]. He was most likely exposed to [address] [facility] in the 1970s when he had an episode of acute icteric hepatitis. While living in [place] he was treated with peginterferon and ribavirin in the 2000s. He was treated with SOF and RBV for 12 weeks in 2015. He failed to achieve SVR with these treatments. He was treated with Epclusa and bid Ribavirin between 7/24/2016 -10/15/2016. He completed the prescribed 12 weeks of therapy. He had a virologic response but never became HCV RNA undetectable. The patient underwent a right lobe segmental liver resection while living in [place]. The pathology is not known but the patient states he was told this was benign. The most recent imaging of the liver was performed in 08/2017 with CT. Likely infiltrative lesion in segment 6 and 7 of the right hepatic lobe. A Fibroscan demonstrated cirrhosis with a value of 18.8 kPa    The patient notes fatigue, pain in the right side over the liver, and swelling of the lower extremities. The pain is getting worse. The patient has not experienced problems concentrating, swelling of the abdomen, hematemesis, hematochezia.     The patient has Moderate limitations in functional activities which can be attributed to the liver disease and to other medical problems that are not related to the liver disease. ALLERGIES:  No Known Allergies    MEDICATIONS  Current Outpatient Prescriptions   Medication Sig    metoprolol succinate (TOPROL-XL) 25 mg XL tablet TK 1 T PO D    pioglitazone (ACTOS) 30 mg tablet TK 1 T PO  D    potassium chloride SA (MICRO-K) 10 mEq capsule TK 1 C PO D    oxyCODONE-acetaminophen (PERCOCET 10)  mg per tablet Take 1 Tab by mouth every eight (8) hours as needed for Pain. Max Daily Amount: 3 Tabs.  potassium chloride SR (KLOR-CON 10) 10 mEq tablet Take  by mouth Every Mon, Wed & Sun.    furosemide (LASIX) 40 mg tablet Take  by mouth daily.  tamsulosin (FLOMAX) 0.4 mg capsule Take 0.4 mg by mouth daily.  warfarin (COUMADIN) 5 mg tablet Take 7.5 mg by mouth daily.  lisinopril (PRINIVIL, ZESTRIL) 5 mg tablet Take  by mouth daily. Indications: HYPERTENSION    glimepiride (AMARYL) 2 mg tablet Take  by mouth every morning.  metFORMIN (GLUCOPHAGE) 500 mg tablet Take  by mouth daily (with breakfast).  spironolactone (ALDACTONE) 25 mg tablet Take  by mouth daily. No current facility-administered medications for this visit. SYSTEM REVIEW NOT RELATED TO LIVER DISEASE OR REVIEWED ABOVE:  Constitution systems: Negative for fever, chills, weight gain, weight loss. Eyes: Negative for visual changes. ENT: Negative for sore throat, painful swallowing. Respiratory: Negative for cough, hemoptysis, SOB. Cardiology: Negative for chest pain, palpitations. GI:  Negative for constipation or diarrhea. Positive for low right abdominal pain. : Negative for urinary frequency, dysuria, hematuria, nocturia. Skin: Negative for rash. Hematology: Negative for easy bruising, blood clots. Musculo-skeletal: Negative for back pain, muscle pain, weakness. Neurologic: Negative for headaches, dizziness, vertigo, memory problems not related to HE. Psychology: Negative for anxiety, depression.      FAMILY HISTORY  There is no family members with HCV or liver disease    SOCIAL HISTORY:  The patient is . The patient has 3 children, 7 grandchildren. The patient has never used tobacco products. The patient has been abstinent from alcohol since 1970s. The patient used to work as . The patient retired in 2005. PHYSICAL EXAMINATION:  Visit Vitals    /73 (BP 1 Location: Right arm, BP Patient Position: Sitting)    Pulse 84    Temp 97.3 °F (36.3 °C) (Tympanic)    Resp 18    Ht 5' 10\" (1.778 m)    Wt 269 lb (122 kg)    SpO2 97%    BMI 38.6 kg/m2     General: No acute distress. Eyes: Sclera anicteric. ENT: No oral lesions. Thyroid normal.  Nodes: No adenopathy. Skin: No spider angiomata. No jaundice. No palmar erythema. Respiratory: Lungs clear to auscultation. Cardiovascular: Regular heart rate. No murmurs. No JVD. Abdomen: Soft non-tender. Liver size normal to percussion/palpation. Spleen not palpable. No obvious ascites. Extremities: No lower edema. No muscle wasting. No gross arthritic changes. Neurologic: Alert and oriented. Cranial nerves grossly intact. No asterixsis.     LABORATORY STUDIES:   Liver [place] of [address] [place] [address] & Units 9/6/2017 8/17/2017 6/28/2017   WBC 3.4 - 10.8 x10E3/uL 5.3 5.6 5.6   ANC 1.4 - 7.0 x10E3/uL 3.2  2.9   HGB 12.6 - 17.7 g/dL 13.5 14.4 14.4    - 379 x10E3/uL 168 157 170   INR 0.8 - 1.2 1.9 (H) 1.0 1.4 (H)   AST 0 - 40 IU/L 58 (H) 68 (H) 73 (H)   ALT 0 - 44 IU/L 71 (H) 94 (H) 90 (H)   Alk Phos 39 - 117 IU/L 57 86 63   Bili, Total 0.0 - 1.2 mg/dL 0.4 0.4 0.6   Bili, Direct 0.00 - 0.40 mg/dL 0.14 0.2 0.16   Albumin 3.5 - 4.8 g/dL 3.8 3.5 4.1   BUN 8 - 27 mg/dL 20 22 (H) 20   Creat 0.76 - 1.27 mg/dL 0.88 1.11 0.82   Na 134 - 144 mmol/L 137 140 139   K 3.5 - 5.2 mmol/L 4.2 3.9 4.4   Cl 96 - 106 mmol/L 99 103 99   CO2 18 - 29 mmol/L 21 29 23   Glucose 65 - 99 mg/dL 177 (H) 106 (H) 84       CANCER SCREENING:  Cancer Screening  6/28/2017 5/2/2017 11/1/2016   AFP, Serum  46.7 (H) 79.8 (H) 5.0   AFP-L3%  14.5 (H) 12.4 (H) 8.5     SEROLOGIES:  2012. HBA1C 6.7    Serologies Latest Ref Rng 2015   Hep A Ab, Total Negative  Positive (A)   Hep B Surface Ag Negative  Negative   Hep B Core Ab, Total Negative  Positive (A)   Hep B Surface Ab 0.00 - 0.99 Index Value  0.15   Hep C Genotype  2 CANCELED   HCV RT-PCR, Quant  [id number]    IL28B Genotype   TT genotype     2015. HCV RNA Positive  10/2015. HCV RNA undetectable  2015. HCV RNA log 6.7 intU  2016. HCV RNA Log 6.8 intU  2016. HCV RNA positive  2016. Undetectable  2016.  690 intU  2017. HCV RNA Log 7 intU, HCV genotype 2    LIVER HISTOLOGY:  2015. FibroScan performed at [address]. EkPa was 18.8. Suggested fibrosis level is F4. ENDOSCOPIC PROCEDURES:  2015. EGD by Dr. Vasu Garcia. Small esophageal varices. No banding performed. Repeat in 2 years. RADIOLOGY:  2012. Ultrasound of liver. Echogenic consistent with cirrhosis. 4.5x3.6 cm right lobe complex mass lesion. No dilated bile ducts. No ascites. 2012. CT scan abdomen with and without IV contrast.  Evidence of prior right lobe resection. The liver otherwise appears normal.  No liver mass lesions. No dilated bile ducts. No bile duct strictures. No ascites. 2015. Ultrasound of liver. Echogenic consistent with chronic liver disease. No liver mass lesions. No dilated bile ducts. No ascites. Post surgical changes. 2016. Ultrasound of liver. Echogenic consistent with cirrhosis. No liver mass lesions. No dilated bile ducts. No ascites. 2017. Dynamic MRI of the abdomen. Consistent with cirrhosis. 2.8 cm enhancing subcapsular mass right hepatic lobe. Also 15 mm solid mass. 2017. Abdominal CT w/wo contrast.  Likely infiltrative lesion in segment 6 and 7 of the right hepatic lobe. OTHER TESTIN2017. Liver biopsy. The EMR in this case has been reviewed.  The clinical story is quite compelling for a consideration of hepatocellular carcinoma. The biopsy sample does not contain enough of the atypical material for a firm diagnosis. ASSESSMENT AND PLAN:  Chronic HCV with cirrhosis and probable HCC. The most recent laboratory studies indicate that the liver transaminases are elevated, alkaline phosphatase is normal, tests of hepatic synthetic and metabolic function are normal, and the platelet count is normal.  Will perform laboratory testing to monitor liver function and degree of liver injury. This will include hepatic panel, a CBC w/ diff, a BMP, a PT/INR, and an AFP-L3%. Complications of cirrhosis were discussed in detail. We discussed thrombocytopenia, portal hypertension, varices, GI bleeding, peripheral edema, ascites, hepatic encephalopathy, and hepatocellular carcinoma. We discussed the need for follow ups on a regular basis, at 3 month intervals to monitor for complications. We discussed the need for every 6 month liver imaging studies. I am very concerned that the patient has developed an [address] His AFP has jumped from the normal range to about 80. The biopsy of the liver lesion was inadequate for a definite diagnosis. He has been referred to Dr. Nikia Galvez and mapping will be performed in the next couple of weeks. This looks like an infiltrative HCC per the recent CT. The patient was previously treated for HCV with several different therapies including PEGINF and RBV, SOF and RBV, and most recently Epclusa and RBV. He has not achieved SVR. The patient has HCV genotype 2. Will not treat the HCV again until he cancer interventions have been performed. The patient was directed to continue all current medications at the current dosages. There are no contraindications for the patient to take any medications that are necessary for treatment of other medical issues. The patient was counseled regarding alcohol consumption.       Vaccination for viral hepatitis A and B is not needed. The patient has serologic evidence of prior exposure or vaccination with immunity. All of the above issues were discussed with the patient. All questions were answered. The patient expressed a clear understanding of the above. 30 minutes total time spent with this patient with more than 50% of this time spent counseling and coordinating care as described above. 1901 Merged with [facility] in 8 weeks.      Arianna Munguia NP   Liver [place] of [facility] [address], [address] [place] [address] Venita Acosta, [address] [place] [address]   [phone]

## 2017-09-07 LAB
AFP L3 MFR SERPL: 29.7 % (ref 0–9.9)
AFP SERPL-MCNC: 27.5 NG/ML (ref 0–8)
ALBUMIN SERPL-MCNC: 3.8 G/DL (ref 3.5–4.8)
ALP SERPL-CCNC: 57 IU/L (ref 39–117)
ALT SERPL-CCNC: 71 IU/L (ref 0–44)
AST SERPL-CCNC: 58 IU/L (ref 0–40)
BASOPHILS # BLD AUTO: 0 X10E3/UL (ref 0–0.2)
BASOPHILS NFR BLD AUTO: 1 %
BILIRUB DIRECT SERPL-MCNC: 0.14 MG/DL (ref 0–0.4)
BILIRUB SERPL-MCNC: 0.4 MG/DL (ref 0–1.2)
BUN SERPL-MCNC: 20 MG/DL (ref 8–27)
BUN/CREAT SERPL: 23 (ref 10–24)
CALCIUM SERPL-MCNC: 9.1 MG/DL (ref 8.6–10.2)
CHLORIDE SERPL-SCNC: 99 MMOL/L (ref 96–106)
CO2 SERPL-SCNC: 21 MMOL/L (ref 18–29)
CREAT SERPL-MCNC: 0.88 MG/DL (ref 0.76–1.27)
EOSINOPHIL # BLD AUTO: 0.1 X10E3/UL (ref 0–0.4)
EOSINOPHIL NFR BLD AUTO: 2 %
ERYTHROCYTE [DISTWIDTH] IN BLOOD BY AUTOMATED COUNT: 14.1 % (ref 12.3–15.4)
GLUCOSE SERPL-MCNC: 177 MG/DL (ref 65–99)
HCT VFR BLD AUTO: 41.2 % (ref 37.5–51)
HGB BLD-MCNC: 13.5 G/DL (ref 12.6–17.7)
IMM GRANULOCYTES # BLD: 0 X10E3/UL (ref 0–0.1)
IMM GRANULOCYTES NFR BLD: 1 %
INR PPP: 1.9 (ref 0.8–1.2)
LYMPHOCYTES # BLD AUTO: 1.3 X10E3/UL (ref 0.7–3.1)
LYMPHOCYTES NFR BLD AUTO: 25 %
MCH RBC QN AUTO: 33.6 PG (ref 26.6–33)
MCHC RBC AUTO-ENTMCNC: 32.8 G/DL (ref 31.5–35.7)
MCV RBC AUTO: 103 FL (ref 79–97)
MONOCYTES # BLD AUTO: 0.6 X10E3/UL (ref 0.1–0.9)
MONOCYTES NFR BLD AUTO: 12 %
NEUTROPHILS # BLD AUTO: 3.2 X10E3/UL (ref 1.4–7)
NEUTROPHILS NFR BLD AUTO: 59 %
PLATELET # BLD AUTO: 168 X10E3/UL (ref 150–379)
POTASSIUM SERPL-SCNC: 4.2 MMOL/L (ref 3.5–5.2)
PROT SERPL-MCNC: 7.1 G/DL (ref 6–8.5)
PROTHROMBIN TIME: 19.2 SEC (ref 9.1–12)
RBC # BLD AUTO: 4.02 X10E6/UL (ref 4.14–5.8)
SODIUM SERPL-SCNC: 137 MMOL/L (ref 134–144)
WBC # BLD AUTO: 5.3 X10E3/UL (ref 3.4–10.8)

## 2017-09-15 ENCOUNTER — HOSPITAL ENCOUNTER (OUTPATIENT)
Dept: NUCLEAR MEDICINE | Age: 72
Discharge: HOME | End: 2017-09-15
Attending: RADIOLOGY
Payer: MEDICARE

## 2017-09-15 ENCOUNTER — HOSPITAL ENCOUNTER (OUTPATIENT)
Dept: INTERVENTIONAL RADIOLOGY/VASCULAR | Age: 72
Discharge: HOME | End: 2017-09-15
Attending: RADIOLOGY | Admitting: RADIOLOGY
Payer: MEDICARE

## 2017-09-15 VITALS
DIASTOLIC BLOOD PRESSURE: 86 MMHG | HEIGHT: 70 IN | OXYGEN SATURATION: 98 % | WEIGHT: 260 LBS | BODY MASS INDEX: 37.22 KG/M2 | RESPIRATION RATE: 13 BRPM | SYSTOLIC BLOOD PRESSURE: 151 MMHG | HEART RATE: 66 BPM

## 2017-09-15 VITALS — SYSTOLIC BLOOD PRESSURE: 134 MMHG | DIASTOLIC BLOOD PRESSURE: 78 MMHG

## 2017-09-15 DIAGNOSIS — C22.8: ICD-10-CM

## 2017-09-15 LAB
ALBUMIN SERPL-MCNC: 3.3 G/DL (ref 3.4–5)
ALBUMIN/GLOB SERPL: 0.7 {RATIO} (ref 0.8–1.7)
ALP SERPL-CCNC: 62 U/L (ref 45–117)
ALT SERPL-CCNC: 80 U/L (ref 16–61)
ANION GAP SERPL CALC-SCNC: 6 MMOL/L (ref 3–18)
APTT PPP: 25.8 SEC (ref 23–36.4)
AST SERPL-CCNC: 54 U/L (ref 15–37)
BILIRUB DIRECT SERPL-MCNC: 0.2 MG/DL (ref 0–0.2)
BILIRUB SERPL-MCNC: 0.6 MG/DL (ref 0.2–1)
BUN SERPL-MCNC: 21 MG/DL (ref 7–18)
BUN/CREAT SERPL: 26 (ref 12–20)
CALCIUM SERPL-MCNC: 8.8 MG/DL (ref 8.5–10.1)
CHLORIDE SERPL-SCNC: 107 MMOL/L (ref 100–108)
CO2 SERPL-SCNC: 28 MMOL/L (ref 21–32)
CREAT SERPL-MCNC: 0.8 MG/DL (ref 0.6–1.3)
ERYTHROCYTE [DISTWIDTH] IN BLOOD BY AUTOMATED COUNT: 13.2 % (ref 11.6–14.5)
GLOBULIN SER CALC-MCNC: 4.5 G/DL (ref 2–4)
GLUCOSE SERPL-MCNC: 96 MG/DL (ref 74–99)
HCT VFR BLD AUTO: 40.1 % (ref 36–48)
HGB BLD-MCNC: 13.4 G/DL (ref 13–16)
INR PPP: 1.1 (ref 0.8–1.2)
MCH RBC QN AUTO: 34 PG (ref 24–34)
MCHC RBC AUTO-ENTMCNC: 33.4 G/DL (ref 31–37)
MCV RBC AUTO: 101.8 FL (ref 74–97)
PLATELET # BLD AUTO: 127 K/UL (ref 135–420)
PMV BLD AUTO: 9.7 FL (ref 9.2–11.8)
POTASSIUM SERPL-SCNC: 4.1 MMOL/L (ref 3.5–5.5)
PROT SERPL-MCNC: 7.8 G/DL (ref 6.4–8.2)
PROTHROMBIN TIME: 13.4 SEC (ref 11.5–15.2)
RBC # BLD AUTO: 3.94 M/UL (ref 4.7–5.5)
SODIUM SERPL-SCNC: 141 MMOL/L (ref 136–145)
WBC # BLD AUTO: 4.9 K/UL (ref 4.6–13.2)

## 2017-09-15 PROCEDURE — 77030037141 HC CATH MIC PX SLIM PENU -F

## 2017-09-15 PROCEDURE — 36245 INS CATH ABD/L-EXT ART 1ST: CPT

## 2017-09-15 PROCEDURE — 80076 HEPATIC FUNCTION PANEL: CPT | Performed by: RADIOLOGY

## 2017-09-15 PROCEDURE — C1887 CATHETER, GUIDING: HCPCS

## 2017-09-15 PROCEDURE — 85610 PROTHROMBIN TIME: CPT | Performed by: RADIOLOGY

## 2017-09-15 PROCEDURE — 74011250636 HC RX REV CODE- 250/636

## 2017-09-15 PROCEDURE — 80048 BASIC METABOLIC PNL TOTAL CA: CPT | Performed by: RADIOLOGY

## 2017-09-15 PROCEDURE — 78205 NM LIVER IMAGE SPECT: CPT

## 2017-09-15 PROCEDURE — 74011250636 HC RX REV CODE- 250/636: Performed by: RADIOLOGY

## 2017-09-15 PROCEDURE — 77030022017 HC DRSG HEMO QCLOT ZMED -A

## 2017-09-15 PROCEDURE — 74011000250 HC RX REV CODE- 250: Performed by: RADIOLOGY

## 2017-09-15 PROCEDURE — C1769 GUIDE WIRE: HCPCS

## 2017-09-15 PROCEDURE — 85027 COMPLETE CBC AUTOMATED: CPT | Performed by: RADIOLOGY

## 2017-09-15 PROCEDURE — 77030037670 HC PLG MICRO VASCLR MEDT -H

## 2017-09-15 PROCEDURE — C1760 CLOSURE DEV, VASC: HCPCS

## 2017-09-15 PROCEDURE — 85730 THROMBOPLASTIN TIME PARTIAL: CPT | Performed by: RADIOLOGY

## 2017-09-15 PROCEDURE — C9113 INJ PANTOPRAZOLE SODIUM, VIA: HCPCS | Performed by: RADIOLOGY

## 2017-09-15 PROCEDURE — 74011636320 HC RX REV CODE- 636/320: Performed by: RADIOLOGY

## 2017-09-15 RX ORDER — MIDAZOLAM HYDROCHLORIDE 1 MG/ML
1 INJECTION, SOLUTION INTRAMUSCULAR; INTRAVENOUS
Status: DISCONTINUED | OUTPATIENT
Start: 2017-09-15 | End: 2017-09-15

## 2017-09-15 RX ORDER — SODIUM CHLORIDE 0.9 % (FLUSH) 0.9 %
5-10 SYRINGE (ML) INJECTION AS NEEDED
Status: DISCONTINUED | OUTPATIENT
Start: 2017-09-15 | End: 2017-09-18 | Stop reason: HOSPADM

## 2017-09-15 RX ORDER — FLUMAZENIL 0.1 MG/ML
0.2 INJECTION INTRAVENOUS
Status: DISCONTINUED | OUTPATIENT
Start: 2017-09-15 | End: 2017-09-18 | Stop reason: HOSPADM

## 2017-09-15 RX ORDER — CIPROFLOXACIN 2 MG/ML
400 INJECTION, SOLUTION INTRAVENOUS ONCE
Status: COMPLETED | OUTPATIENT
Start: 2017-09-15 | End: 2017-09-15

## 2017-09-15 RX ORDER — IODIXANOL 320 MG/ML
200 INJECTION, SOLUTION INTRAVASCULAR
Status: COMPLETED | OUTPATIENT
Start: 2017-09-15 | End: 2017-09-15

## 2017-09-15 RX ORDER — FENTANYL CITRATE 50 UG/ML
50 INJECTION, SOLUTION INTRAMUSCULAR; INTRAVENOUS
Status: DISCONTINUED | OUTPATIENT
Start: 2017-09-15 | End: 2017-09-15

## 2017-09-15 RX ORDER — NALOXONE HYDROCHLORIDE 0.4 MG/ML
0.1 INJECTION, SOLUTION INTRAMUSCULAR; INTRAVENOUS; SUBCUTANEOUS
Status: DISCONTINUED | OUTPATIENT
Start: 2017-09-15 | End: 2017-09-18 | Stop reason: HOSPADM

## 2017-09-15 RX ORDER — HEPARIN SODIUM 200 [USP'U]/100ML
INJECTION, SOLUTION INTRAVENOUS
Status: DISPENSED
Start: 2017-09-15 | End: 2017-09-15

## 2017-09-15 RX ORDER — SODIUM CHLORIDE 9 MG/ML
20 INJECTION, SOLUTION INTRAVENOUS CONTINUOUS
Status: DISCONTINUED | OUTPATIENT
Start: 2017-09-15 | End: 2017-09-18 | Stop reason: HOSPADM

## 2017-09-15 RX ORDER — ONDANSETRON 2 MG/ML
4 INJECTION INTRAMUSCULAR; INTRAVENOUS AS NEEDED
Status: DISCONTINUED | OUTPATIENT
Start: 2017-09-15 | End: 2017-09-18 | Stop reason: HOSPADM

## 2017-09-15 RX ORDER — DIPHENHYDRAMINE HYDROCHLORIDE 50 MG/ML
25 INJECTION, SOLUTION INTRAMUSCULAR; INTRAVENOUS
Status: COMPLETED | OUTPATIENT
Start: 2017-09-15 | End: 2017-09-15

## 2017-09-15 RX ORDER — SODIUM CHLORIDE 0.9 % (FLUSH) 0.9 %
5-10 SYRINGE (ML) INJECTION EVERY 8 HOURS
Status: DISCONTINUED | OUTPATIENT
Start: 2017-09-15 | End: 2017-09-18 | Stop reason: HOSPADM

## 2017-09-15 RX ORDER — ONDANSETRON 2 MG/ML
4 INJECTION INTRAMUSCULAR; INTRAVENOUS
Status: COMPLETED | OUTPATIENT
Start: 2017-09-15 | End: 2017-09-15

## 2017-09-15 RX ORDER — LIDOCAINE HYDROCHLORIDE 10 MG/ML
30 INJECTION, SOLUTION EPIDURAL; INFILTRATION; INTRACAUDAL; PERINEURAL ONCE
Status: COMPLETED | OUTPATIENT
Start: 2017-09-15 | End: 2017-09-15

## 2017-09-15 RX ORDER — ONDANSETRON 2 MG/ML
INJECTION INTRAMUSCULAR; INTRAVENOUS
Status: COMPLETED
Start: 2017-09-15 | End: 2017-09-15

## 2017-09-15 RX ADMIN — SODIUM CHLORIDE 40 MG: 9 INJECTION INTRAMUSCULAR; INTRAVENOUS; SUBCUTANEOUS at 09:25

## 2017-09-15 RX ADMIN — DIPHENHYDRAMINE HYDROCHLORIDE 25 MG: 50 INJECTION, SOLUTION INTRAMUSCULAR; INTRAVENOUS at 09:25

## 2017-09-15 RX ADMIN — ONDANSETRON 4 MG: 2 INJECTION INTRAMUSCULAR; INTRAVENOUS at 10:47

## 2017-09-15 RX ADMIN — LIDOCAINE HYDROCHLORIDE 30 ML: 10 INJECTION, SOLUTION EPIDURAL; INFILTRATION; INTRACAUDAL; PERINEURAL at 10:27

## 2017-09-15 RX ADMIN — ONDANSETRON 4 MG: 2 INJECTION INTRAMUSCULAR; INTRAVENOUS at 09:25

## 2017-09-15 RX ADMIN — SODIUM CHLORIDE 20 ML/HR: 900 INJECTION, SOLUTION INTRAVENOUS at 09:25

## 2017-09-15 RX ADMIN — IODIXANOL 200 ML: 320 INJECTION, SOLUTION INTRAVASCULAR at 10:33

## 2017-09-15 RX ADMIN — CIPROFLOXACIN 400 MG: 2 INJECTION, SOLUTION INTRAVENOUS at 10:11

## 2017-09-15 RX ADMIN — FENTANYL CITRATE 50 MCG: 50 INJECTION INTRAMUSCULAR; INTRAVENOUS at 10:28

## 2017-09-15 RX ADMIN — MIDAZOLAM HYDROCHLORIDE 1 MG: 1 INJECTION, SOLUTION INTRAMUSCULAR; INTRAVENOUS at 10:28

## 2017-09-15 NOTE — H&P
OUTPATIENT HISTORY AND PHYSICAL      Today 9/15/2017     Indication/Symptoms:   Nuno Gilbert is a 66 y.o. male here for mesenteric angiography with embolization and lung shunt fraction analysis. [facility] pre Y[facility] workup. Current Meds:    Prior to Admission medications    Medication Sig Start Date End Date Taking? Authorizing Provider   HYDROmorphone (DILAUDID) 4 mg tablet Take 1 Tab by mouth every four (4) hours as needed for Pain. Max Daily Amount: 24 mg. 9/6/17  Yes Keely San NP   metoprolol succinate (TOPROL-XL) 25 mg XL tablet TK 1 T PO D 4/17/17  Yes Historical Provider   pioglitazone (ACTOS) 30 mg tablet TK 1 T PO  D 6/17/17  Yes Historical Provider   potassium chloride SA (MICRO-K) 10 mEq capsule TK 1 C PO D 6/18/17  Yes Historical Provider   potassium chloride SR (KLOR-CON 10) 10 mEq tablet Take  by mouth Every Mon, Wed & Sun.   Yes Historical Provider   furosemide (LASIX) 40 mg tablet Take  by mouth daily. Yes Historical Provider   tamsulosin (FLOMAX) 0.4 mg capsule Take 0.4 mg by mouth daily. Yes Historical Provider   lisinopril (PRINIVIL, ZESTRIL) 5 mg tablet Take  by mouth daily. Indications: HYPERTENSION   Yes Historical Provider   glimepiride (AMARYL) 2 mg tablet Take  by mouth every morning. Yes Historical Provider   metFORMIN (GLUCOPHAGE) 500 mg tablet Take  by mouth daily (with breakfast). Yes Historical Provider   spironolactone (ALDACTONE) 25 mg tablet Take  by mouth daily. Yes Historical Provider   naloxone [facility]) 4 mg/actuation spry Use 1 spray intranasally into 1 nostril. Use a new Narcan nasal spray for subsequent doses and administer into alternating nostrils. May repeat every 2 to 3 minutes as needed. 9/6/17   Keely San NP   warfarin (COUMADIN) 5 mg tablet Take 7.5 mg by mouth daily.     Historical Provider       Allergies:    No Known Allergies    Comorbid Conditions:    Past Medical History:   Diagnosis Date    [facility]     Diabetes ([facility] 64y/o    Elevated PSA     Headache     Hemangioma     Hep C w/o coma, chronic (HCC)     Hypertension 64y/o          Past Surgical History:   Procedure Laterality Date    COLONOSCOPY N/A 6/20/2016    COLONOSCOPY performed by Matthias Del Angel [facility] at [facility] ENDOSCOPY    HX COLECTOMY      HX COLONOSCOPY      HX OTHER SURGICAL      liver surgery     Data:    Visit Vitals    Ht 5' 10\" (1.778 m)    Wt 117.9 kg (260 lb)    BMI 37.31 kg/m2   :  Recent Labs      09/15/17   0911   PLT  127*     Recent Labs      09/15/17   0911   INR  1.1   APTT  25.8       The H & P and/or progress notes and any available imaging were reviewed. The risks, indications and possible alternatives to the procedure, including doing nothing, were discussed and informed consent was obtained. Physical Exam:      Mental status:   Alert and oriented. Examination specific to the procedure proposed to be performed and any co morbid conditions:   Mallampati classification 2 ,  ASA2   Heart:   RRR. Lungs:   CTAB. No wheezes, rales or rhonchi. The patient is an appropriate candidate to undergo the planned procedure and sedation.     Claire Landaverde MD

## 2017-09-15 NOTE — PROGRESS NOTES
TRANSFER - IN REPORT:    Verbal report received from Christine  on [employer].  being received from IR for routine post - op      Report consisted of patients Situation, Background, Assessment and   Recommendations(SBAR). Information from the following report(s) SBAR, Procedure Summary and MAR was reviewed with the receiving nurse. Opportunity for questions and clarification was provided. Assessment completed upon patients arrival to unit and care assumed.

## 2017-09-15 NOTE — DISCHARGE INSTRUCTIONS
Discharge Instructions    *Check the puncture site frequently for swelling or bleeding. If you see any bleeding, lie down and apply pressure over the area with a clean town or washcloth. Notify your doctor for any redness, swelling, drainage or oozing from the puncture site. Notify your doctor for any fever or chills. *If the leg or arm with the puncture becomes cold, numb or painful, call Dr Wilfred Colbert 's office    *Activity should be limited for the next 48 hours. Climb stairs as little as possible and avoid any stooping, bending or strenuous activity for 48 hours. No heavy lifting (anything over 10 pounds) for three days. *Do not drive for 48 hours. *You may resume your usual diet. Drink more fluids than usual.    *Have a responsible person drive you home and stay with you for at least 24 hours after your heart catheterization/angiography. *You may remove the bandage from your Right and Groin in 24 hours. You may shower in 24 hours. No tub baths, hot tubs or swimming for one week.  Do not place any lotions, creams, powders, ointments over the pu

## 2017-09-15 NOTE — PROGRESS NOTES
Pt c/o nausea and \"metallic taste in my mouth\". Given Zofran 4mg IV per orders. Will cont to monitor.

## 2017-09-15 NOTE — PROCEDURES
RADIOLOGY POST PROCEDURE NOTE     September 15, 2017       11:57 AM     Preoperative Diagnosis:   HCC. Postoperative Diagnosis:  Same. :  Dr. Fernie Wooten    Assistant:  None. Type of Anesthesia: 1% plain lidocaine and IV moderate sedation with Versed and Fentanyl    Procedure/Description:  Mesenteric angiography with embolization and lung shunt fraction analysis. Findings:   No bleeding. Estimated blood Loss:  Minimal    Specimen Removed:   no    Blood transfusions:  None. Implants:  See report.     Complications: None    Condition: Stable    Discharge Plan:  discharge home     Gladys Donohue MD

## 2017-09-15 NOTE — PROGRESS NOTES
TRANSFER - OUT REPORT:    Verbal report given to Shala Thomson RN(name) on [place].  being transferred to [facility] after [facility](unit) for routine post - op       Report consisted of patients Situation, Background, Assessment and   Recommendations(SBAR). Information from the following report(s) SBAR, Kardex and MAR was reviewed with the receiving nurse. Lines:   Peripheral IV 12/21/12 Left Antecubital (Active)       Peripheral IV 08/17/17 Left Hand (Active)       Peripheral IV 09/15/17 Right Arm (Active)        Opportunity for questions and clarification was provided.       Patient transported with:   [employer]

## 2017-09-21 RX ORDER — SODIUM CHLORIDE 9 MG/ML
20 INJECTION, SOLUTION INTRAVENOUS CONTINUOUS
Status: CANCELLED | OUTPATIENT
Start: 2017-09-21

## 2017-09-25 ENCOUNTER — HOSPITAL ENCOUNTER (OUTPATIENT)
Dept: INTERVENTIONAL RADIOLOGY/VASCULAR | Age: 72
Discharge: HOME | End: 2017-09-25
Attending: RADIOLOGY | Admitting: RADIOLOGY
Payer: MEDICARE

## 2017-09-25 ENCOUNTER — HOSPITAL ENCOUNTER (OUTPATIENT)
Dept: INTERVENTIONAL RADIOLOGY/VASCULAR | Age: 72
Discharge: HOME | End: 2017-09-25
Attending: RADIOLOGY
Payer: MEDICARE

## 2017-09-25 ENCOUNTER — HOSPITAL ENCOUNTER (OUTPATIENT)
Dept: INTERVENTIONAL RADIOLOGY/VASCULAR | Age: 72
End: 2017-09-25
Attending: RADIOLOGY
Payer: MEDICARE

## 2017-09-25 ENCOUNTER — APPOINTMENT (OUTPATIENT)
Dept: NUCLEAR MEDICINE | Age: 72
End: 2017-09-25
Attending: RADIOLOGY
Payer: MEDICARE

## 2017-09-25 VITALS
TEMPERATURE: 97.7 F | SYSTOLIC BLOOD PRESSURE: 119 MMHG | RESPIRATION RATE: 9 BRPM | HEART RATE: 62 BPM | DIASTOLIC BLOOD PRESSURE: 66 MMHG | OXYGEN SATURATION: 97 % | HEIGHT: 70 IN | WEIGHT: 260 LBS | BODY MASS INDEX: 37.22 KG/M2

## 2017-09-25 DIAGNOSIS — C22.0 LIVER CARCINOMA (HCC): ICD-10-CM

## 2017-09-25 LAB
ALBUMIN SERPL-MCNC: 3.3 G/DL (ref 3.4–5)
ALBUMIN/GLOB SERPL: 0.7 {RATIO} (ref 0.8–1.7)
ALP SERPL-CCNC: 79 U/L (ref 45–117)
ALT SERPL-CCNC: 77 U/L (ref 16–61)
ANION GAP SERPL CALC-SCNC: 5 MMOL/L (ref 3–18)
APTT PPP: 25.3 SEC (ref 23–36.4)
AST SERPL-CCNC: 59 U/L (ref 15–37)
BILIRUB DIRECT SERPL-MCNC: 0.1 MG/DL (ref 0–0.2)
BILIRUB SERPL-MCNC: 0.5 MG/DL (ref 0.2–1)
BUN SERPL-MCNC: 22 MG/DL (ref 7–18)
BUN/CREAT SERPL: 26 (ref 12–20)
CALCIUM SERPL-MCNC: 9.5 MG/DL (ref 8.5–10.1)
CHLORIDE SERPL-SCNC: 107 MMOL/L (ref 100–108)
CO2 SERPL-SCNC: 27 MMOL/L (ref 21–32)
CREAT SERPL-MCNC: 0.84 MG/DL (ref 0.6–1.3)
ERYTHROCYTE [DISTWIDTH] IN BLOOD BY AUTOMATED COUNT: 13.2 % (ref 11.6–14.5)
GLOBULIN SER CALC-MCNC: 5 G/DL (ref 2–4)
GLUCOSE SERPL-MCNC: 103 MG/DL (ref 74–99)
HCT VFR BLD AUTO: 41.6 % (ref 36–48)
HGB BLD-MCNC: 14 G/DL (ref 13–16)
INR PPP: 1 (ref 0.8–1.2)
MCH RBC QN AUTO: 34.5 PG (ref 24–34)
MCHC RBC AUTO-ENTMCNC: 33.7 G/DL (ref 31–37)
MCV RBC AUTO: 102.5 FL (ref 74–97)
PLATELET # BLD AUTO: 160 K/UL (ref 135–420)
PMV BLD AUTO: 9.8 FL (ref 9.2–11.8)
POTASSIUM SERPL-SCNC: 4.3 MMOL/L (ref 3.5–5.5)
PROT SERPL-MCNC: 8.3 G/DL (ref 6.4–8.2)
PROTHROMBIN TIME: 12.7 SEC (ref 11.5–15.2)
RBC # BLD AUTO: 4.06 M/UL (ref 4.7–5.5)
SODIUM SERPL-SCNC: 139 MMOL/L (ref 136–145)
WBC # BLD AUTO: 5.5 K/UL (ref 4.6–13.2)

## 2017-09-25 PROCEDURE — 77030008584 HC TOOL GDWRE DEV TERU -A

## 2017-09-25 PROCEDURE — 77030031139 HC SUT VCRL2 J&J -A

## 2017-09-25 PROCEDURE — C1769 GUIDE WIRE: HCPCS

## 2017-09-25 PROCEDURE — C1887 CATHETER, GUIDING: HCPCS

## 2017-09-25 PROCEDURE — 85027 COMPLETE CBC AUTOMATED: CPT | Performed by: RADIOLOGY

## 2017-09-25 PROCEDURE — 85730 THROMBOPLASTIN TIME PARTIAL: CPT | Performed by: RADIOLOGY

## 2017-09-25 PROCEDURE — 74011250636 HC RX REV CODE- 250/636: Performed by: RADIOLOGY

## 2017-09-25 PROCEDURE — C2616 BRACHYTX, NON-STR,YTTRIUM-90: HCPCS

## 2017-09-25 PROCEDURE — 80048 BASIC METABOLIC PNL TOTAL CA: CPT | Performed by: RADIOLOGY

## 2017-09-25 PROCEDURE — 74011636320 HC RX REV CODE- 636/320: Performed by: RADIOLOGY

## 2017-09-25 PROCEDURE — 74011000258 HC RX REV CODE- 258: Performed by: RADIOLOGY

## 2017-09-25 PROCEDURE — 85610 PROTHROMBIN TIME: CPT | Performed by: RADIOLOGY

## 2017-09-25 PROCEDURE — C9113 INJ PANTOPRAZOLE SODIUM, VIA: HCPCS | Performed by: RADIOLOGY

## 2017-09-25 PROCEDURE — C1760 CLOSURE DEV, VASC: HCPCS

## 2017-09-25 PROCEDURE — 76937 US GUIDE VASCULAR ACCESS: CPT

## 2017-09-25 PROCEDURE — 74011250636 HC RX REV CODE- 250/636

## 2017-09-25 PROCEDURE — 74011000250 HC RX REV CODE- 250: Performed by: RADIOLOGY

## 2017-09-25 PROCEDURE — 77030037141 HC CATH MIC PX SLIM PENU -F

## 2017-09-25 PROCEDURE — 80076 HEPATIC FUNCTION PANEL: CPT | Performed by: RADIOLOGY

## 2017-09-25 RX ORDER — SODIUM CHLORIDE 9 MG/ML
20 INJECTION, SOLUTION INTRAVENOUS CONTINUOUS
Status: DISCONTINUED | OUTPATIENT
Start: 2017-09-25 | End: 2017-09-25 | Stop reason: HOSPADM

## 2017-09-25 RX ORDER — HEPARIN SODIUM 200 [USP'U]/100ML
INJECTION, SOLUTION INTRAVENOUS
Status: DISCONTINUED
Start: 2017-09-25 | End: 2017-09-25 | Stop reason: HOSPADM

## 2017-09-25 RX ORDER — ONDANSETRON 2 MG/ML
4 INJECTION INTRAMUSCULAR; INTRAVENOUS
Status: COMPLETED | OUTPATIENT
Start: 2017-09-25 | End: 2017-09-25

## 2017-09-25 RX ORDER — IODIXANOL 320 MG/ML
200 INJECTION, SOLUTION INTRAVASCULAR
Status: COMPLETED | OUTPATIENT
Start: 2017-09-25 | End: 2017-09-25

## 2017-09-25 RX ORDER — DIPHENHYDRAMINE HYDROCHLORIDE 50 MG/ML
25 INJECTION, SOLUTION INTRAMUSCULAR; INTRAVENOUS
Status: COMPLETED | OUTPATIENT
Start: 2017-09-25 | End: 2017-09-25

## 2017-09-25 RX ORDER — LIDOCAINE HYDROCHLORIDE 10 MG/ML
30 INJECTION, SOLUTION EPIDURAL; INFILTRATION; INTRACAUDAL; PERINEURAL ONCE
Status: COMPLETED | OUTPATIENT
Start: 2017-09-25 | End: 2017-09-25

## 2017-09-25 RX ORDER — CIPROFLOXACIN 2 MG/ML
400 INJECTION, SOLUTION INTRAVENOUS ONCE
Status: COMPLETED | OUTPATIENT
Start: 2017-09-25 | End: 2017-09-25

## 2017-09-25 RX ORDER — FENTANYL CITRATE 50 UG/ML
12.5-5 INJECTION, SOLUTION INTRAMUSCULAR; INTRAVENOUS
Status: DISCONTINUED | OUTPATIENT
Start: 2017-09-25 | End: 2017-09-25 | Stop reason: ALTCHOICE

## 2017-09-25 RX ORDER — ONDANSETRON 2 MG/ML
INJECTION INTRAMUSCULAR; INTRAVENOUS
Status: COMPLETED
Start: 2017-09-25 | End: 2017-09-25

## 2017-09-25 RX ORDER — SODIUM CHLORIDE 9 MG/ML
100 INJECTION, SOLUTION INTRAVENOUS CONTINUOUS
Status: DISPENSED | OUTPATIENT
Start: 2017-09-25 | End: 2017-09-25

## 2017-09-25 RX ORDER — MIDAZOLAM HYDROCHLORIDE 1 MG/ML
1 INJECTION, SOLUTION INTRAMUSCULAR; INTRAVENOUS
Status: DISCONTINUED | OUTPATIENT
Start: 2017-09-25 | End: 2017-09-25 | Stop reason: ALTCHOICE

## 2017-09-25 RX ADMIN — MIDAZOLAM HYDROCHLORIDE 0.5 MG: 1 INJECTION, SOLUTION INTRAMUSCULAR; INTRAVENOUS at 12:30

## 2017-09-25 RX ADMIN — LIDOCAINE HYDROCHLORIDE 30 ML: 10 INJECTION, SOLUTION EPIDURAL; INFILTRATION; INTRACAUDAL; PERINEURAL at 12:15

## 2017-09-25 RX ADMIN — IODIXANOL 200 ML: 320 INJECTION, SOLUTION INTRAVASCULAR at 12:35

## 2017-09-25 RX ADMIN — SODIUM CHLORIDE 100 ML/HR: 900 INJECTION, SOLUTION INTRAVENOUS at 10:00

## 2017-09-25 RX ADMIN — SODIUM CHLORIDE 40 MG: 9 INJECTION INTRAMUSCULAR; INTRAVENOUS; SUBCUTANEOUS at 11:12

## 2017-09-25 RX ADMIN — ONDANSETRON 4 MG: 2 INJECTION, SOLUTION INTRAMUSCULAR; INTRAVENOUS at 12:52

## 2017-09-25 RX ADMIN — MIDAZOLAM HYDROCHLORIDE 1 MG: 1 INJECTION, SOLUTION INTRAMUSCULAR; INTRAVENOUS at 12:10

## 2017-09-25 RX ADMIN — ONDANSETRON HYDROCHLORIDE 4 MG: 2 SOLUTION INTRAMUSCULAR; INTRAVENOUS at 11:12

## 2017-09-25 RX ADMIN — MIDAZOLAM HYDROCHLORIDE 0.5 MG: 1 INJECTION, SOLUTION INTRAMUSCULAR; INTRAVENOUS at 12:45

## 2017-09-25 RX ADMIN — FENTANYL CITRATE 50 MCG: 50 INJECTION INTRAMUSCULAR; INTRAVENOUS at 12:10

## 2017-09-25 RX ADMIN — CIPROFLOXACIN 400 MG: 2 INJECTION, SOLUTION INTRAVENOUS at 12:00

## 2017-09-25 RX ADMIN — DIPHENHYDRAMINE HYDROCHLORIDE 25 MG: 50 INJECTION, SOLUTION INTRAMUSCULAR; INTRAVENOUS at 11:12

## 2017-09-25 RX ADMIN — SODIUM CHLORIDE 20 ML/HR: 900 INJECTION, SOLUTION INTRAVENOUS at 10:00

## 2017-09-25 RX ADMIN — FENTANYL CITRATE 25 MCG: 50 INJECTION INTRAMUSCULAR; INTRAVENOUS at 13:00

## 2017-09-25 RX ADMIN — FENTANYL CITRATE 25 MCG: 50 INJECTION INTRAMUSCULAR; INTRAVENOUS at 12:30

## 2017-09-25 RX ADMIN — DEXAMETHASONE SODIUM PHOSPHATE 20 MG: 4 INJECTION, SOLUTION INTRAMUSCULAR; INTRAVENOUS at 11:13

## 2017-09-25 NOTE — IP AVS SNAPSHOT
Aniceto Elders 
 
 
 [address] Patient: Sharyl Lesches. MRN: [id number] You are allergic to the following No active allergies Recent Documentation Height Weight BMI Smoking Status 1.778 m 117.9 kg 37.31 kg/m2 Former Smoker Emergency Contacts Name Discharge Info Relation Home Work Mobile Angelina Flores DISCHARGE CAREGIVER [3] Spouse [3] [phone] Flores,Maey DISCHARGE CAREGIVER [3] Spouse [3] [phone] About your hospitalization You were admitted on:  September 25, 2017 You last received care in the:  [facility] [address] You were discharged on:  September 25, 2017 Unit phone number:  [phone] Why you were hospitalized Your primary diagnosis was:  Not on File Providers Seen During Your Hospitalizations Provider Role Specialty Primary office phone Kal Pittman MD Attending Provider Radiology [phone] Your Primary Care Physician (PCP) Primary Care Physician Office Phone Office Fax Richa [facility] [phone] Follow-up Information Follow up With Details Comments Contact Info Treasure Loredo MD   [address] [facility] [address] [phone] Kal Pittman MD In 2 weeks  [address] [facility] [address] [facility] [address] 
[phone] Your Appointments Wednesday November 08, 2017  1:30 PM EST [place] [address] [address] [address]  
[phone] Current Discharge Medication List  
  
CONTINUE these medications which have NOT CHANGED Dose & Instructions Dispensing Information Comments Morning Noon Evening Bedtime COUMADIN 5 mg tablet Generic drug:  warfarin Your last dose was: Your next dose is:    
   
   
 Dose:  7.5 mg Take 7.5 mg by mouth daily. Refills:  0  
     
   
   
   
  
 furosemide 40 mg tablet Commonly known as:  LASIX Your last dose was: Your next dose is: Take  by mouth daily. Refills:  0  
     
   
   
   
  
 glimepiride 2 mg tablet Commonly known as:  AMARYL Your last dose was: Your next dose is: Take  by mouth every morning. Refills:  0 HYDROmorphone 4 mg tablet Commonly known as:  DILAUDID Your last dose was: Your next dose is:    
   
   
 Dose:  4 mg Take 1 Tab by mouth every four (4) hours as needed for Pain. Max Daily Amount: 24 mg. Quantity:  60 Tab Refills:  0  
     
   
   
   
  
 lisinopril 5 mg tablet Commonly known as:  Nakul Baez Your last dose was: Your next dose is: Take  by mouth daily. Indications: HYPERTENSION Refills:  0  
     
   
   
   
  
 metFORMIN 500 mg tablet Commonly known as:  GLUCOPHAGE Your last dose was: Your next dose is: Take  by mouth daily (with breakfast). Refills:  0  
     
   
   
   
  
 metoprolol succinate 25 mg XL tablet Commonly known as:  TOPROL-XL Your last dose was: Your next dose is:    
   
   
 TK 1 T PO D Refills:  3  
     
   
   
   
  
 naloxone 4 mg/actuation nasal spray Commonly known as:  [employer] Your last dose was: Your next dose is:    
   
   
 Use 1 spray intranasally into 1 nostril. Use a new Narcan nasal spray for subsequent doses and administer into alternating nostrils. May repeat every 2 to 3 minutes as needed. Quantity:  2 Packet Refills:  1  
     
   
   
   
  
 pioglitazone 30 mg tablet Commonly known as:  ACTOS Your last dose was: Your next dose is:    
   
   
 TK 1 T PO  D Refills:  2 * potassium chloride SR 10 mEq tablet Commonly known as:  KLOR-CON 10 Your last dose was: Your next dose is: Take  by mouth Every Mon, Wed & Sun.  
 Refills:  0  
     
   
   
   
  
 * potassium chloride SA 10 mEq capsule Commonly known as:  Misty Tan Your last dose was: Your next dose is:    
   
   
 TK 1 C PO D Refills:  2  
     
   
   
   
  
 spironolactone 25 mg tablet Commonly known as:  ALDACTONE Your last dose was: Your next dose is: Take  by mouth daily. Refills:  0  
     
   
   
   
  
 tamsulosin 0.4 mg capsule Commonly known as:  FLOMAX Your last dose was: Your next dose is:    
   
   
 Dose:  0.4 mg Take 0.4 mg by mouth daily. Refills:  0  
     
   
   
   
  
 * Notice: This list has 2 medication(s) that are the same as other medications prescribed for you. Read the directions carefully, and ask your doctor or other care provider to review them with you. Discharge Instructions [address] Department of Interventional Radiology [facility] 
[phone] Office 
[phone] Fax DISCHARGE SUMMARY from Nurse PATIENT INSTRUCTIONS: 
 
After general  intravenous sedation, for 24 hours : 
· Limit your activities · Do not drive and operate hazardous machinery · Do not make important personal or business decisions · Do  not drink alcoholic beverages · If you have not urinated within 8 hours after discharge, please contact your surgeon on call. Report the following to your surgeon: 
 
 
Chemoembolization Discharge Instructions General Information: 
Chemoembolization is a procedure used to treat a tumor in your liver.  Using angiography (a medical imaging technique) your doctor has inserted beads that have been soaked in a chemotherapy agent directly into the tumor. The goal of this therapy is to cut off the blood supply to the tumor and deliver the cancer killing drugs directly into the tumor, thus slowing growth. Home Care Instructions: You can resume your regular diet. Do not tub bathe, swim, drink alcohol, or make any important legal decisions for the next 24 hours. Do not lift anything heavier than a gallon of milk for 5 days. If you take Glucophage (metformin) for diabetes, do not take it for the next 48 hours. Over the next week you may experience abdominal pain, nausea, vomiting, and a low grade fever. You will be given a prescription to deal with these symptoms. Follow-Up Instructions:  2 weeks Dr Mervyn Castleman Call If: 
 You should call your Physician and/or the Radiology Nurse if you have any signs of infection like fever, drainage, redness, and/or swelling. If the puncture site should ooze, please call. Also call if you have any pain, decreased sensation, numbness, tingling, swelling, or change in color to the affected extremity. SEEK IMMEDIATE MEDICAL CARE IF YOUR PUNCTURE SITE STARTS TO BLEED. APPLY ENOUGH FIRM PRESSURE TO THE SITE WITH THE TIPS OF YOUR FINGERS TO STOP THE BLEEDING. Arterial bleeding is a medical emergency and should be evaluated immediately. Call your doctor immediately if you develop a fever greater than 101.5°F, shaking, chills, or dizziness. Also notify your doctor if you develop severe right upper abdominal pain or nausea/vomiting and the symptoms are not relieved by medication. Metformin Discharge Instructions General Information: If you take Metformin for your diabetes and received IV contrast dye today, it is strongly recommended that you stop taking Metformin for 48 hours after receiving the IV contrast. There is a rare but serious side effect that can occur if you continue your medicine within this time frame.  It is also important that you speak with your doctor about what to do to manage your diabetes in the meantime. Some doctors may want you to temporarily use another medicine. The makers of Metformin also recommend getting a lab test called creatinine level drawn 48 hours after the injection of IV contrast to check your kidney function before you restart your medication. A nurse from the Radiology department will be calling you in two days to make sure that you had this done, and if it is safe for you to restart your Metformin. The following medications have Metformin in them: Riomet, Glucophage, Fortamet, Glucophage XR, Glumeza, Metglip, Glipzide, Glucovance, Glyburide, Avandamet, Rosiglitazone, Actoplus Met, and Pioglitazone. Home Care Instructions: You can resume your regular diet and other medications. Please drink lots of fluid if your diet allows for it to help your kidneys clear out the contrast. 
 
Call If:  
 You should call your Physician and/or the Radiology Nurse if you have any of the following symptoms: malaise (feeling Tired or poorly), nonspecific abdominal pain, myalgia (unexplained nonspecific pain), respiratory distress, increasing somnolence (unexplained sleepiness. If you are cold, and you feel like your heart rate and/or blood pressure are low, you should report to the emergency department. Tell the doctor in the emergency room that you take metformin and you had IV contrast. 
 
Follow-Up Instructions: Your doctors office will be contacting our staff to notify them of the need to stop your diabetes medicine, and to have the lab work drawn. They should be calling you to arrange for this. If you do not hear from them in the next 24 hours, you should call them. · To Reach Us:  Any questions What to do at Home: 
Recommended activity: No lifting, or Strenuous exercise for 1 week *  Please give a list of your current medications to your Primary Care Provider.  
 
*  Please update this list whenever your medications are discontinued, doses are 
 changed, or new medications (including over-the-counter products) are added. *  Please carry medication information at all times in case of emergency situations. These are general instructions for a healthy lifestyle: No smoking/ No tobacco products/ Avoid exposure to second hand smoke Surgeon General's Warning:  Quitting smoking now greatly reduces serious risk to your health. Obesity, smoking, and sedentary lifestyle greatly increases your risk for illness A healthy diet, regular physical exercise & weight monitoring are important for maintaining a healthy lifestyle You may be retaining fluid if you have a history of heart failure or if you experience any of the following symptoms:  Weight gain of 3 pounds or more overnight or 5 pounds in a week, increased swelling in our hands or feet or shortness of breath while lying flat in bed. Please call your doctor as soon as you notice any of these symptoms; do not wait until your next office visit. Recognize signs and symptoms of STROKE: 
 
F-face looks uneven A-arms unable to move or move unevenly S-speech slurred or non-existent T-time-call 911 as soon as signs and symptoms begin-DO NOT go Back to bed or wait to see if you get better-TIME IS BRAIN. Warning Signs of HEART ATTACK Call 911 if you have these symptoms: 
? Chest discomfort. Most heart attacks involve discomfort in the center of the chest that lasts more than a few minutes, or that goes away and comes back. It can feel like uncomfortable pressure, squeezing, fullness, or pain. ? Discomfort in other areas of the upper body. Symptoms can include pain or discomfort in one or both arms, the back, neck, jaw, or stomach. ? Shortness of breath with or without chest discomfort. ? Other signs may include breaking out in a cold sweat, nausea, or lightheadedness. Don't wait more than five minutes to call [address]!  Fast action can save your life. Calling 911 is almost always the fastest way to get lifesaving treatment. Emergency Medical Services staff can begin treatment when they arrive  up to an hour sooner than if someone gets to the hospital by car. The discharge information has been reviewed with the patient. The patient verbalized understanding. Please start taking Augmentin, Prilosec, and Zofran as perscribed Discharge medications reviewed with the patient and appropriate educational materials and side effects teaching were provided. Patient armband removed and shredded Patient Signature: 
Date: 9/25/2017 Discharging Nurse:  
 
 
 
Discharge Orders Procedure Order Date Status Priority Quantity Spec Type Associated Dx DIET REGULAR 09/25/17 1405 Normal Routine 1 Introducing [facility]! Dear Marleny Lezama: 
Thank you for requesting a [employer] account. Our records indicate that you already have an active [employer] account. You can access your account anytime at [email or web address] Did you know that you can access your hospital and ER discharge instructions at any time in [employer]? You can also review all of your test results from your hospital stay or ER visit. Additional Information If you have questions, please visit the Frequently Asked Questions section of the [employer] website at [email or web address] Remember, [employer] is NOT to be used for urgent needs. For medical emergencies, dial 911. Now available from your iPhone and Android! General Information Please provide this summary of care documentation to your next provider. Patient Signature:  ____________________________________________________________ Date:  ____________________________________________________________  
  
Mitra Finger Provider Signature:  ____________________________________________________________ Date:  ____________________________________________________________

## 2017-09-25 NOTE — PROGRESS NOTES
TRANSFER - OUT REPORT:    Verbal report [facility] to S[facility](name[facility]  [facility]) for routine post - op       Report consisted of patients Situation, Background, Assessment and   Recommendations(SBAR). Information from the following report(s) SBAR, Kardex, Procedure Summary and MAR was reviewed with the receiving nurse. Lines:   Peripheral IV 12/21/12 Left Antecubital (Active)       Peripheral IV 08/17/17 Left Hand (Active)       Peripheral IV 09/15/17 Right Arm (Active)       Peripheral IV 09/25/17 Right Arm (Active)        Opportunity for questions and clarification was provided.       Patient transported with:   Carma

## 2017-09-25 NOTE — DISCHARGE INSTRUCTIONS
[address] [facility] Department of Interventional Radiology  [facility]  [phone] Office  [phone] Fax        DISCHARGE SUMMARY from Nurse    PATIENT INSTRUCTIONS:    After general  intravenous sedation, for 24 hours :  · Limit your activities  · Do not drive and operate hazardous machinery  · Do not make important personal or business decisions  · Do  not drink alcoholic beverages  · If you have not urinated within 8 hours after discharge, please contact your surgeon on call. Report the following to your surgeon:      Chemoembolization Discharge Instructions    General Information:  Chemoembolization is a procedure used to treat a tumor in your liver. Using angiography (a medical imaging technique) your doctor has inserted beads that have been soaked in a chemotherapy agent directly into the tumor. The goal of this therapy is to cut off the blood supply to the tumor and deliver the cancer killing drugs directly into the tumor, thus slowing growth. Home Care Instructions: You can resume your regular diet. Do not tub bathe, swim, drink alcohol, or make any important legal decisions for the next 24 hours. Do not lift anything heavier than a gallon of milk for 5 days. If you take Glucophage (metformin) for diabetes, do not take it for the next 48 hours. Over the next week you may experience abdominal pain, nausea, vomiting, and a low grade fever. You will be given a prescription to deal with these symptoms. Follow-Up Instructions:  2 weeks Dr Karsten Sethi     Call If:   Marie Gonzalez should call your Physician and/or the Radiology Nurse if you have any signs of infection like fever, drainage, redness, and/or swelling. If the puncture site should ooze, please call. Also call if you have any pain, decreased sensation, numbness, tingling, swelling, or change in color to the affected extremity. SEEK IMMEDIATE MEDICAL CARE IF YOUR PUNCTURE SITE STARTS TO BLEED.   APPLY ENOUGH FIRM PRESSURE TO THE SITE WITH THE TIPS OF YOUR FINGERS TO STOP THE BLEEDING. Arterial bleeding is a medical emergency and should be evaluated immediately. Call your doctor immediately if you develop a fever greater than 101.5°F, shaking, chills, or dizziness. Also notify your doctor if you develop severe right upper abdominal pain or nausea/vomiting and the symptoms are not relieved by medication. Metformin Discharge Instructions    General Information:   If you take Metformin for your diabetes and received IV contrast dye today, it is strongly recommended that you stop taking Metformin for 48 hours after receiving the IV contrast. There is a rare but serious side effect that can occur if you continue your medicine within this time frame. It is also important that you speak with your doctor about what to do to manage your diabetes in the meantime. Some doctors may want you to temporarily use another medicine. The makers of Metformin also recommend getting a lab test called creatinine level drawn 48 hours after the injection of IV contrast to check your kidney function before you restart your medication. A nurse from the Radiology department will be calling you in two days to make sure that you had this done, and if it is safe for you to restart your Metformin. The following medications have Metformin in them: Riomet, Glucophage, Fortamet, Glucophage XR, Glumeza, Metglip, Glipzide, Glucovance, Glyburide, Avandamet, Rosiglitazone, Actoplus Met, and Pioglitazone. Home Care Instructions: You can resume your regular diet and other medications.  Please drink lots of fluid if your diet allows for it to help your kidneys clear out the contrast.    Call If:    You should call your Physician and/or the Radiology Nurse if you have any of the following symptoms: malaise (feeling Tired or poorly), nonspecific abdominal pain, myalgia (unexplained nonspecific pain), respiratory distress, increasing somnolence (unexplained sleepiness. If you are cold, and you feel like your heart rate and/or blood pressure are low, you should report to the emergency department. Tell the doctor in the emergency room that you take metformin and you had IV contrast.    Follow-Up Instructions: Your doctors office will be contacting our staff to notify them of the need to stop your diabetes medicine, and to have the lab work drawn. They should be calling you to arrange for this. If you do not hear from them in the next 24 hours, you should call them. · To Reach Us:  Any questions    What to do at Home:  Recommended activity: No lifting, or Strenuous exercise for 1 week    *  Please give a list of your current medications to your Primary Care Provider. *  Please update this list whenever your medications are discontinued, doses are      changed, or new medications (including over-the-counter products) are added. *  Please carry medication information at all times in case of emergency situations. These are general instructions for a healthy lifestyle:    No smoking/ No tobacco products/ Avoid exposure to second hand smoke    Surgeon General's Warning:  Quitting smoking now greatly reduces serious risk to your health. Obesity, smoking, and sedentary lifestyle greatly increases your risk for illness    A healthy diet, regular physical exercise & weight monitoring are important for maintaining a healthy lifestyle    You may be retaining fluid if you have a history of heart failure or if you experience any of the following symptoms:  Weight gain of 3 pounds or more overnight or 5 pounds in a week, increased swelling in our hands or feet or shortness of breath while lying flat in bed. Please call your doctor as soon as you notice any of these symptoms; do not wait until your next office visit.     Recognize signs and symptoms of STROKE:    F-face looks uneven    A-arms unable to move or move unevenly    S-speech slurred or non-existent    T-time-call 911 as soon as signs and symptoms begin-DO NOT go       Back to bed or wait to see if you get better-TIME IS BRAIN. Warning Signs of HEART ATTACK     Call 911 if you have these symptoms:   Chest discomfort. Most heart attacks involve discomfort in the center of the chest that lasts more than a few minutes, or that goes away and comes back. It can feel like uncomfortable pressure, squeezing, fullness, or pain.  Discomfort in other areas of the upper body. Symptoms can include pain or discomfort in one or both arms, the back, neck, jaw, or stomach.  Shortness of breath with or without chest discomfort.  Other signs may include breaking out in a cold sweat, nausea, or lightheadedness. Don't wait more than five minutes to call 911 - MINUTES MATTER! Fast action can save your life. Calling 911 is almost always the fastest way to get lifesaving treatment. Emergency Medical Services staff can begin treatment when they arrive -- up to an hour sooner than if someone gets to the hospital by car. The discharge information has been reviewed with the patient. The patient verbalized understanding. Please start taking Augmentin, Prilosec, and Zofran as perscribed    Discharge medications reviewed with the patient and appropriate educational materials and side effects teaching were provided.     Patient armband removed and shredded      Patient Signature:  Date: 9/25/2017  Discharging Nurse:

## 2017-09-25 NOTE — PROGRESS NOTES
09/25/17 1326   Vital Signs   Pulse (Heart Rate) 62   Cardiac Rhythm NSR   Resp Rate (!) 7   O2 Sat (%) 96 %   Level of Consciousness Responds to Voice   /89   MAP (Calculated) 104   Oxygen Therapy   O2 Device Room air   Modified Chandrakant Score   Activity 2   Respiration 2   Circulation 2   Consciousness 2   O2 Saturation 2   Chandrakant Score 10   Post-Procedure Site Assessment (1)   Wound Type Puncture   Location Groin   Orientation  Right   Closure Device Mynx   Site Assessment No bleeding; No hematoma   Patient  Assessment   Skin Color Appropriate for ethnicity   Skin Condition/Temp Warm   Peripheral Vascular   Peripheral Vascular (WDL) WDL   LLE Peripheral Vascular    Color Appropriate for race   Temperature Warm   Sensation Present   Post-tibial Pulse +2   Pedal Pulse +2   RLE Peripheral Vascular    Color Appropriate for race   Temperature Warm   Sensation Present   Post-tibial Pulse Palpable;+2   Pedal Pulse Palpable;+2   received patient from IR. Right groin site checked-WDL. Neurovascular checks to RLE WDL.

## 2017-09-25 NOTE — H&P
OUTPATIENT HISTORY AND PHYSICAL      Today 9/25/2017     Indication/Symptoms:   Ann-Marie Rooney is a 66 y.o. male here for Y90 Therasphere Radioembolization of the right hepatic lobe tumor. [facility] Current Meds:    Prior to Admission medications    Medication Sig Start Date End Date Taking? Authorizing Provider   HYDROmorphone (DILAUDID) 4 mg tablet Take 1 Tab by mouth every four (4) hours as needed for Pain. Max Daily Amount: 24 mg. 9/6/17   Kourtney Thrasher NP   naloxone [facility]) 4 mg/actuation spry Use 1 spray intranasally into 1 nostril. Use a new Narcan nasal spray for subsequent doses and administer into alternating nostrils. May repeat every 2 to 3 minutes as needed. 9/6/17   Kourtney Thrasher NP   metoprolol succinate (TOPROL-XL) 25 mg XL tablet TK 1 T PO D 4/17/17   Historical Provider   pioglitazone (ACTOS) 30 mg tablet TK 1 T PO  D 6/17/17   Historical Provider   potassium chloride SA (MICRO-K) 10 mEq capsule TK 1 C PO D 6/18/17   Historical Provider   potassium chloride SR (KLOR-CON 10) 10 mEq tablet Take  by mouth Every Mon, Wed & Sun.    Historical Provider   furosemide (LASIX) 40 mg tablet Take  by mouth daily. Historical Provider   tamsulosin (FLOMAX) 0.4 mg capsule Take 0.4 mg by mouth daily. Historical Provider   warfarin (COUMADIN) 5 mg tablet Take 7.5 mg by mouth daily. Historical Provider   lisinopril (PRINIVIL, ZESTRIL) 5 mg tablet Take  by mouth daily. Indications: HYPERTENSION    Historical Provider   glimepiride (AMARYL) 2 mg tablet Take  by mouth every morning. Historical Provider   metFORMIN (GLUCOPHAGE) 500 mg tablet Take  by mouth daily (with breakfast). Historical Provider   spironolactone (ALDACTONE) 25 mg tablet Take  by mouth daily.     Historical Provider       Allergies:    No Known Allergies    Comorbid Conditions:    Past Medical History:   Diagnosis Date    Colon cancer ([facility]     Diabetes ([facility] [facility] 64y/o    Elevated PSA     Headache     Hemangioma     Hep C w/o coma, chronic (HCC)     Hypertension 62y/o          Past Surgical History:   Procedure Laterality Date    COLONOSCOPY N/A 6/20/2016    COLONOSCOPY performed by Femi Medrano MD at [facility] ENDOSCOPY    HX COLECTOMY      HX COLONOSCOPY      HX OTHER SURGICAL      liver surgery     Data:    There were no vitals taken for this visit.:  No results for input(s): PLT, PLTEXT in the last 72 hours. No lab exists for component:  HCT  No results for input(s): INR, APTT in the last 72 hours. No lab exists for component: PT, INREXT    The H & P and/or progress notes and any available imaging were reviewed. The risks, indications and possible alternatives to the procedure, including doing nothing, were discussed and informed consent was obtained. Physical Exam:      Mental status:   Alert and oriented. Examination specific to the procedure proposed to be performed and any co morbid conditions:   Mallampati classification 2 ,  ASA3   Heart:   RRR. Lungs:   CTAB. No wheezes, rales or rhonchi. The patient is an appropriate candidate to undergo the planned procedure and sedation.     Irish Brown MD

## 2017-09-25 NOTE — PROCEDURES
RADIOLOGY POST PROCEDURE NOTE     September 25, 2017       1:14 PM     Preoperative Diagnosis:   HCC. Postoperative Diagnosis:  Same. :  Dr. Ana Cintron    Assistant:  None. Type of Anesthesia: 1% plain lidocaine and IV moderate sedation with Versed and Fentanyl    Procedure/Description: Mesenteric angiography with Y90 Therasphere radioembolization right lobe HCC. Findings:   No bleeding. Estimated blood Loss:  Minimal    Specimen Removed:   no    Blood transfusions:  None. Implants:  None.     Complications: None    Condition: Stable    Discharge Plan:  discharge home     Bhargav Moreno MD

## 2017-10-18 ENCOUNTER — TELEPHONE (OUTPATIENT)
Dept: HEMATOLOGY | Age: 72
End: 2017-10-18

## 2017-10-18 NOTE — TELEPHONE ENCOUNTER
[facility] . Robert Flores Pt states he has called Dr. Betzy Rivera office multiple times to get results and he has received no reply!

## 2017-10-23 ENCOUNTER — TELEPHONE (OUTPATIENT)
Dept: HEMATOLOGY | Age: 72
End: 2017-10-23

## 2017-10-23 NOTE — TELEPHONE ENCOUNTER
Patient's wife is calling in to get most recent lab results. I informed her that I will send a message to HANY Farris and his nurses to call the patient. Patient's wife confirmed understanding. Please contact patient. Patient's wife isn't on the permission to release information form.

## 2017-11-08 ENCOUNTER — OFFICE VISIT (OUTPATIENT)
Dept: HEMATOLOGY | Age: 72
End: 2017-11-08

## 2017-11-08 VITALS
SYSTOLIC BLOOD PRESSURE: 136 MMHG | HEART RATE: 84 BPM | WEIGHT: 270 LBS | RESPIRATION RATE: 18 BRPM | OXYGEN SATURATION: 97 % | DIASTOLIC BLOOD PRESSURE: 75 MMHG | BODY MASS INDEX: 38.65 KG/M2 | TEMPERATURE: 98.2 F | HEIGHT: 70 IN

## 2017-11-08 DIAGNOSIS — C22.0 HEPATOCELLULAR CARCINOMA (HCC): Primary | ICD-10-CM

## 2017-11-08 RX ORDER — OXYCODONE HYDROCHLORIDE 30 MG/1
30 TABLET ORAL
Qty: 90 TAB | Refills: 0 | Status: SHIPPED | OUTPATIENT
Start: 2017-11-08 | End: 2017-12-21 | Stop reason: ALTCHOICE

## 2017-11-08 NOTE — PROGRESS NOTES
[address] [place], [address], [place], [place]       Brianne Mcclure, LINH Hernandez President, [facility]   HANY Tinoco NP        at [address]     [address] [place] [address], [address] [address]     [phone]     FAX: [phone]    at [address], [id number] [place] [address], [address]     [phone]     FAX: [phone]       Patient Care Team:  Lexus Thompson MD as PCP - General (Unknown Physician Specialty)  Sheryl Blanco MD (Gastroenterology)  Lexus Thompson MD (Unknown Physician Specialty)      Problem List  Date Reviewed: 11/8/2017          Codes Class Noted    Hepatocellular carcinoma ([facility] ICD-10-CM: C22.0  ICD-9-CM: 155.0  9/6/2017        A-fib ([facility] ICD-10-CM: I48.91  ICD-9-CM: 427.31  11/30/2015        Cirrhosis ([facility] ICD-10-CM: K74.60  ICD-9-CM: 571.5  6/30/2015        Elevated prostate specific antigen (PSA) ICD-10-CM: R97.20  ICD-9-CM: 790.93  5/10/2014        H/O resection of liver ICD-10-CM: Z90.49  ICD-9-CM: V15.29  12/24/2012    Overview Signed 12/24/2012  1:22 AM by Maribel Hunter MD     Partial right lobectomy. Reason for this is not known. Chronic hepatitis C ([facility] ICD-10-CM: B18.2  ICD-9-CM: 070.54  12/5/2012    Overview Addendum 6/30/2016  9:26 AM by Maribel Hunter MD     Peginterferon/ribavirin ~5466. Treated 6 months.   SOF/RBV relapse             Hypertension ICD-10-CM: I10  ICD-9-CM: 401.9  12/5/2012        PAT (paroxysmal atrial tachycardia) ([facility] [facility] ICD-10-CM: I47.1  ICD-9-CM: 427.0  12/5/2012    Overview Signed 12/5/2012  2:12 PM by Maribel Hunter MD     Ablation 2002             Colon cancer [facility]) ICD-10-CM: C18.9  ICD-9-CM: 153.9  12/5/2012    Overview Signed 12/5/2012  2:13 PM by Maribel Hunter MD     Partial colectomy 2004             S/P knee surgery ICD-10-CM: D09.024  ICD-9-CM: V45.89  12/5/2012    Overview Signed 12/5/2012  2:14 PM by Anika Bower MD     2010                   Giorgi Ulloa. returns to the [facility] [place] for management of cirrhosis secondary to chronic HCV. Mr. Huseyin Pinedo has a recently discovered liver cancer. The active problem list, all pertinent past medical history, medications, liver histology, endoscopic studies, radiologic findings and laboratory findings related to the liver disorder were reviewed with the patient. The patient is a 74 y.o.  male who first found out he had HCV in the early 2000s when he was living in [place]. He was most likely exposed to [address] [facility] [address] in the 1970s when he had an episode of acute icteric hepatitis. While living in [place] he was treated with peginterferon and ribavirin in the 2000s. He was treated with SOF and RBV for 12 weeks in 2015. He failed to achieve SVR with these treatments. He was treated with Epclusa and bid Ribavirin between 7/24/2016 -10/15/2016. He completed the prescribed 12 weeks of therapy. He had a virologic response but never became HCV RNA undetectable. The patient underwent a right lobe segmental liver resection while living in [place]. The pathology is not known but the patient states he was told this was benign. The most recent imaging of the liver was performed in 08/2017 with CT. Likely infiltrative lesion in segment 6 and 7 of the right hepatic lobe. A Fibroscan demonstrated cirrhosis with a value of 18.8 kPa    The patient notes fatigue, pain in the right side over the liver, and swelling of the lower extremities. The pain is getting worse. The patient has not experienced problems concentrating, swelling of the abdomen, hematemesis, hematochezia.     The patient has Moderate limitations in functional activities which can be attributed to the liver disease and to other medical problems that are not related to the liver disease. ALLERGIES:  No Known Allergies    MEDICATIONS  Current Outpatient Prescriptions   Medication Sig    naloxone (NARCAN) 4 mg/actuation spry Use 1 spray intranasally into 1 nostril. Use a new Narcan nasal spray for subsequent doses and administer into alternating nostrils. May repeat every 2 to 3 minutes as needed.  metoprolol succinate (TOPROL-XL) 25 mg XL tablet TK 1 T PO D    pioglitazone (ACTOS) 30 mg tablet TK 1 T PO  D    potassium chloride SA (MICRO-K) 10 mEq capsule TK 1 C PO D    potassium chloride SR (KLOR-CON 10) 10 mEq tablet Take  by mouth Every Mon, Wed & Sun.    furosemide (LASIX) 40 mg tablet Take  by mouth daily.  tamsulosin (FLOMAX) 0.4 mg capsule Take 0.4 mg by mouth daily.  warfarin (COUMADIN) 5 mg tablet Take 7.5 mg by mouth daily.  lisinopril (PRINIVIL, ZESTRIL) 5 mg tablet Take  by mouth daily. Indications: HYPERTENSION    glimepiride (AMARYL) 2 mg tablet Take  by mouth every morning.  metFORMIN (GLUCOPHAGE) 500 mg tablet Take  by mouth daily (with breakfast).  spironolactone (ALDACTONE) 25 mg tablet Take  by mouth daily. No current facility-administered medications for this visit. SYSTEM REVIEW NOT RELATED TO LIVER DISEASE OR REVIEWED ABOVE:  Constitution systems: Negative for fever, chills, weight gain, weight loss. Eyes: Negative for visual changes. ENT: Negative for sore throat, painful swallowing. Respiratory: Negative for cough, hemoptysis, SOB. Cardiology: Negative for chest pain, palpitations. GI:  Negative for constipation or diarrhea. Positive for low right abdominal pain. : Negative for urinary frequency, dysuria, hematuria, nocturia. Skin: Negative for rash. Hematology: Negative for easy bruising, blood clots. Musculo-skeletal: Negative for back pain, muscle pain, weakness. Neurologic: Negative for headaches, dizziness, vertigo, memory problems not related to HE. Psychology: Negative for anxiety, depression. FAMILY HISTORY  There is no family members with HCV or liver disease    SOCIAL HISTORY:  The patient is . The patient has 3 children, 7 grandchildren. The patient has never used tobacco products. The patient has been abstinent from alcohol since 1970s. The patient used to work as . The patient retired in 2005. PHYSICAL EXAMINATION:  Visit Vitals    /75 (BP 1 Location: Right arm, BP Patient Position: Sitting)    Pulse 84    Temp 98.2 °F (36.8 °C) (Tympanic)    Resp 18    Ht 5' 10\" (1.778 m)    Wt 270 lb (122.5 kg)    SpO2 97%    BMI 38.74 kg/m2     General: No acute distress. Eyes: Sclera anicteric. ENT: No oral lesions. Thyroid normal.  Nodes: No adenopathy. Skin: No spider angiomata. No jaundice. No palmar erythema. Respiratory: Lungs clear to auscultation. Cardiovascular: Regular heart rate. No murmurs. No JVD. Abdomen: Soft non-tender. Liver size normal to percussion/palpation. Spleen not palpable. No obvious ascites. Extremities: No lower edema. No muscle wasting. No gross arthritic changes. Neurologic: Alert and oriented. Cranial nerves grossly intact. No asterixsis.     LABORATORY STUDIES:   Liver [place] of [address] & Units 9/6/2017 8/17/2017 6/28/2017   WBC 3.4 - 10.8 x10E3/uL 5.3 5.6 5.6   ANC 1.4 - 7.0 x10E3/uL 3.2  2.9   HGB 12.6 - 17.7 g/dL 13.5 14.4 14.4    - 379 x10E3/uL 168 157 170   INR 0.8 - 1.2 1.9 (H) 1.0 1.4 (H)   AST 0 - 40 IU/L 58 (H) 68 (H) 73 (H)   ALT 0 - 44 IU/L 71 (H) 94 (H) 90 (H)   Alk Phos 39 - 117 IU/L 57 86 63   Bili, Total 0.0 - 1.2 mg/dL 0.4 0.4 0.6   Bili, Direct 0.00 - 0.40 mg/dL 0.14 0.2 0.16   Albumin 3.5 - 4.8 g/dL 3.8 3.5 4.1   BUN 8 - 27 mg/dL 20 22 (H) 20   Creat 0.76 - 1.27 mg/dL 0.88 1.11 0.82   Na 134 - 144 mmol/L 137 140 139   K 3.5 - 5.2 mmol/L 4.2 3.9 4.4   Cl 96 - 106 mmol/L 99 103 99   CO2 18 - 29 mmol/L 21 29 23   Glucose 65 - 99 mg/dL 177 (H) 106 (H) 84       CANCER SCREENING:  Cancer Screening  6/28/2017 5/2/2017 11/1/2016   AFP, Serum  46.7 (H) 79.8 (H) 5.0   AFP-L3%  14.5 (H) 12.4 (H) 8.5     SEROLOGIES:  8/2012. HBA1C 6.7    Serologies Latest Ref Rng 5/19/2015 12/5/2012   Hep A Ab, Total Negative  Positive (A)   Hep B Surface Ag Negative  Negative   Hep B Core Ab, Total Negative  Positive (A)   Hep B Surface Ab 0.00 - 0.99 Index Value  0.15   Hep C Genotype  2 CANCELED   HCV RT-PCR, Quant  [id number]    IL28B Genotype   TT genotype     5/2015. HCV RNA Positive  10/2015. HCV RNA undetectable  12/2015. HCV RNA log 6.7 intU  5/2016. HCV RNA Log 6.8 intU  6/2016. HCV RNA positive  8/2016. Undetectable  11/2016.  690 intU  5/2017. HCV RNA Log 7 intU, HCV genotype 2    LIVER HISTOLOGY:  6/2015. FibroScan performed at [address]. EkPa was 18.8. Suggested fibrosis level is F4.  08/2017. Liver biopsy by Dr. Joshua Alamo. Suspicious for hepatocellular carcinoma, but there is simply insufficient quantity of material for a firm diagnosis. ENDOSCOPIC PROCEDURES:  8/2015. EGD by Dr. Aissatou Kilpatrick. Small esophageal varices. No banding performed. Repeat in 2 years. RADIOLOGY:  12/2012. Ultrasound of liver. Echogenic consistent with cirrhosis. 4.5x3.6 cm right lobe complex mass lesion. No dilated bile ducts. No ascites. 12/2012. CT scan abdomen with and without IV contrast.  Evidence of prior right lobe resection. The liver otherwise appears normal.  No liver mass lesions. No dilated bile ducts. No bile duct strictures. No ascites. 6/2015. Ultrasound of liver. Echogenic consistent with chronic liver disease. No liver mass lesions. No dilated bile ducts. No ascites. Post surgical changes. 5/2016. Ultrasound of liver. Echogenic consistent with cirrhosis. No liver mass lesions. No dilated bile ducts. No ascites. 07/2017. Dynamic MRI of the abdomen. Consistent with cirrhosis. 2.8 cm enhancing subcapsular mass right hepatic lobe.   Also 15 mm solid mass. 2017. Abdominal CT w/wo contrast.  Likely infiltrative lesion in segment 6 and 7 of the right hepatic lobe. OTHER TESTIN2017. Liver biopsy. The EMR in this case has been reviewed. The clinical story is quite compelling for a consideration of hepatocellular carcinoma. The biopsy sample does not contain enough of the atypical material for a firm diagnosis. ASSESSMENT AND PLAN:  Chronic HCV with cirrhosis and probable HCC. The most recent laboratory studies indicate that the liver transaminases are elevated, alkaline phosphatase is normal, tests of hepatic synthetic and metabolic function are normal, and the platelet count is normal.  He refused labs today. Complications of cirrhosis were discussed in detail. We discussed thrombocytopenia, portal hypertension, varices, GI bleeding, peripheral edema, ascites, hepatic encephalopathy, and hepatocellular carcinoma. We discussed the need for follow ups on a regular basis, at 3 month intervals to monitor for complications. We discussed the need for every 6 month liver imaging studies. The patient underwent Y90 therasphere radioembolization of right hepatic lobe tumor on 2017 by Dr. Susi Bob. The patient returned to Dr. Genoveva Bagley one month after the procedure to have imaging completed, but \"freaked out\" during MRI and the imaging could not be completed. We will contact Dr. Mariola Hebert office and inquire about repeating the imaging. The patient is dead set on spending the winter in [place]. He states that he is leaving in the beginning of December. The patient is complaining severe right upper quadrant pain. 30 mg oxycodone IR provided. He reports that he will make the 90 prescibed pills last throughout the winter. The patient was previously treated for HCV with several different therapies including PEGINF and RBV, SOF and RBV, and most recently Epclusa and RBV. He has not achieved SVR.     The patient has HCV genotype 2. Will not treat the HCV again until he cancer interventions have been performed. The patient was directed to continue all current medications at the current dosages. There are no contraindications for the patient to take any medications that are necessary for treatment of other medical issues. The patient was counseled regarding alcohol consumption. Vaccination for viral hepatitis A and B is not needed. The patient has serologic evidence of prior exposure or vaccination with immunity. All of the above issues were discussed with the patient. All questions were answered. The patient expressed a clear understanding of the above. 30 minutes total time spent with this patient with more than 50% of this time spent counseling and coordinating care as described above. [address] [address] as soon as he can upon return from [place].       Mary Jennings NP   Liver [place] of [facility], [address] [place] [address] Venita Acosta, [address] [place] [address]   [phone]

## 2017-11-08 NOTE — MR AVS SNAPSHOT
Visit Information Date & Time Provider Department Dept. Phone Encounter #  
 11/8/2017  1:30 PM Tracee Andrade NP [address] of [place] [id number] Follow-up Instructions Return in about 2 months (around 1/8/2018). Upcoming Health Maintenance Date Due DTaP/Tdap/Td series (1 - Tdap) 10/18/1964 FOBT Q 1 YEAR AGE 50-75 10/18/1993 ZOSTER VACCINE AGE 60> 8/18/2003 GLAUCOMA SCREENING Q2Y 10/18/2008 Pneumococcal 65+ High/Highest Risk (1 of 2 - PCV13) 10/18/2008 MEDICARE YEARLY EXAM 10/18/2008 Influenza Age 5 to Adult 8/1/2017 Allergies as of 11/8/2017  Review Complete On: 11/8/2017 By: Didi Banks No Known Allergies Current Immunizations  Never Reviewed No immunizations on file. Not reviewed this visit Vitals BP Pulse Temp Resp Height(growth percentile) 136/75 (BP 1 Location: Right arm, BP Patient Position: Sitting) 84 98.2 °F (36.8 °C) (Tympanic) 18 5' 10\" (1.778 m) Weight(growth percentile) SpO2 BMI Smoking Status 270 lb (122.5 kg) 97% 38.74 kg/m2 Former Smoker BMI and BSA Data Body Mass Index Body Surface Area 38.74 kg/m 2 2.46 m 2 Preferred Pharmacy Pharmacy Name Phone [address] [place] [address] Drive [phone] Your Updated Medication List  
  
   
This list is accurate as of: 11/8/17  1:56 PM.  Always use your most recent med list.  
  
  
  
  
 COUMADIN 5 mg tablet Generic drug:  warfarin Take 7.5 mg by mouth daily. furosemide 40 mg tablet Commonly known as:  LASIX Take  by mouth daily. glimepiride 2 mg tablet Commonly known as:  AMARYL Take  by mouth every morning. lisinopril 5 mg tablet Commonly known as:  Ryan Edman Take  by mouth daily. Indications: HYPERTENSION  
  
 metFORMIN 500 mg tablet Commonly known as:  GLUCOPHAGE Take  by mouth daily (with breakfast). metoprolol succinate 25 mg XL tablet Commonly known as:  TOPROL-XL TK 1 T PO D  
  
 naloxone 4 mg/actuation nasal spray Commonly known as:  [employer] Use 1 spray intranasally into 1 nostril. Use a new Narcan nasal spray for subsequent doses and administer into alternating nostrils. May repeat every 2 to 3 minutes as needed. oxyCODONE IR 30 mg immediate release tablet Commonly known as:  Emily Palacio Take 1 Tab by mouth every four (4) hours as needed for Pain. Max Daily Amount: 180 mg.  
  
 pioglitazone 30 mg tablet Commonly known as:  ACTOS TK 1 T PO  D  
  
 * potassium chloride SR 10 mEq tablet Commonly known as:  KLOR-CON 10 Take  by mouth Every Mon, Wed & Sun.  
  
 * potassium chloride SA 10 mEq capsule Commonly known as:  MICRO-K  
TK 1 C PO D  
  
 spironolactone 25 mg tablet Commonly known as:  ALDACTONE Take  by mouth daily. tamsulosin 0.4 mg capsule Commonly known as:  FLOMAX Take 0.4 mg by mouth daily. * Notice: This list has 2 medication(s) that are the same as other medications prescribed for you. Read the directions carefully, and ask your doctor or other care provider to review them with you. Prescriptions Printed Refills  
 oxyCODONE IR (ROXICODONE) 30 mg immediate release tablet 0 Sig: Take 1 Tab by mouth every four (4) hours as needed for Pain. Max Daily Amount: 180 mg.  
 Class: Print Route: Oral  
  
Follow-up Instructions Return in about 2 months (around 1/8/2018). Introducing [facility]! Dear Klaudia Garza: 
Thank you for requesting a [employer] account. Our records indicate that you already have an active [employer] account. You can access your account anytime at [email or web address] Did you know that you can access your hospital and ER discharge instructions at any time in [employer]?   You can also review all of your test results from your hospital stay or ER visit. Additional Information If you have questions, please visit the Frequently Asked Questions section of the [employer] website at [email or web address] Remember, [employer] is NOT to be used for urgent needs. For medical emergencies, dial 911. Now available from your iPhone and Android! Please provide this summary of care documentation to your next provider. Your primary care clinician is listed as Babak Peralta. If you have any questions after today's visit, please call [phone].

## 2017-11-08 NOTE — PROGRESS NOTES
Alex Burrell. is a 74 y.o. male    No chief complaint on file. 1. Have you been to the ER, urgent care clinic or hospitalized since your last visit? NO.     2. Have you seen or consulted any other health care providers outside of the [address] [place] [address] since your last visit (Include any pap smears or colon screening)?  NO  Learning Assessment 11/30/2015   PRIMARY LEARNER Patient   PRIMARY LANGUAGE ENGLISH   LEARNER PREFERENCE PRIMARY LISTENING   ANSWERED BY patient   RELATIONSHIP SELF

## 2017-12-21 DIAGNOSIS — C22.0 HEPATOCELLULAR CARCINOMA (HCC): Primary | ICD-10-CM

## 2017-12-21 RX ORDER — OXYCODONE AND ACETAMINOPHEN 10; 325 MG/1; MG/1
1 TABLET ORAL
Qty: 90 TAB | Refills: 0 | Status: SHIPPED | OUTPATIENT
Start: 2017-12-21 | End: 2018-05-09 | Stop reason: DRUGHIGH

## 2018-03-27 ENCOUNTER — TELEPHONE (OUTPATIENT)
Dept: HEMATOLOGY | Age: 73
End: 2018-03-27

## 2018-05-09 ENCOUNTER — OFFICE VISIT (OUTPATIENT)
Dept: HEMATOLOGY | Age: 73
End: 2018-05-09

## 2018-05-09 ENCOUNTER — HOSPITAL ENCOUNTER (OUTPATIENT)
Dept: LAB | Age: 73
Discharge: HOME | End: 2018-05-09

## 2018-05-09 VITALS
OXYGEN SATURATION: 94 % | HEART RATE: 72 BPM | WEIGHT: 266 LBS | DIASTOLIC BLOOD PRESSURE: 92 MMHG | HEIGHT: 70 IN | TEMPERATURE: 97.8 F | RESPIRATION RATE: 20 BRPM | BODY MASS INDEX: 38.08 KG/M2 | SYSTOLIC BLOOD PRESSURE: 151 MMHG

## 2018-05-09 DIAGNOSIS — B18.2 CHRONIC HEPATITIS C WITHOUT HEPATIC COMA (HCC): ICD-10-CM

## 2018-05-09 DIAGNOSIS — C22.0 HEPATOCELLULAR CARCINOMA (HCC): Primary | ICD-10-CM

## 2018-05-09 PROCEDURE — 99001 SPECIMEN HANDLING PT-LAB: CPT | Performed by: NURSE PRACTITIONER

## 2018-05-09 RX ORDER — OXYCODONE HYDROCHLORIDE 15 MG/1
15 TABLET ORAL
Qty: 90 TAB | Refills: 0 | Status: SHIPPED | OUTPATIENT
Start: 2018-05-09 | End: 2018-06-18 | Stop reason: SDUPTHER

## 2018-05-09 NOTE — PROGRESS NOTES
1. Have you been to the ER, urgent care clinic since your last visit? Hospitalized since your last visit? No    2. Have you seen or consulted any other health care providers outside of the [address] [place] [address] since your last visit? Include any pap smears or colon screening.  No     Chief Complaint   Patient presents with    Follow-up

## 2018-05-09 NOTE — MR AVS SNAPSHOT
[address] [address] 
[phone] Patient: Christiana Bryan. MRN: [id number] HPA:[id number] Visit Information Date & Time Provider Department Dept. Phone Encounter #  
 5/9/2018  2:30 PM HANY Yang [address] [place] [address] [phone] Follow-up Instructions Return in about 8 weeks (around 7/4/2018). Upcoming Health Maintenance Date Due DTaP/Tdap/Td series (1 - Tdap) 10/18/1964 FOBT Q 1 YEAR AGE 50-75 10/18/1993 ZOSTER VACCINE AGE 60> 8/18/2003 GLAUCOMA SCREENING Q2Y 10/18/2008 Pneumococcal 65+ High/Highest Risk (1 of 2 - PCV13) 10/18/2008 MEDICARE YEARLY EXAM 3/14/2018 Influenza Age 5 to Adult 8/1/2018 Allergies as of 5/9/2018  Review Complete On: 5/9/2018 By: Saritha Murphy No Known Allergies Current Immunizations  Never Reviewed No immunizations on file. Not reviewed this visit You Were Diagnosed With   
  
 Codes Comments Hepatocellular carcinoma ([facility]    -  Primary ICD-10-CM: C22.0 ICD-9-CM: 155.0 Chronic hepatitis C without hepatic coma (HCC)     ICD-10-CM: B18.2 ICD-9-CM: 070.54 Vitals BP Pulse Temp Resp Height(growth percentile) (!) 151/92 (BP 1 Location: Right arm, BP Patient Position: Sitting) 72 97.8 °F (36.6 °C) (Tympanic) 20 5' 10\" (1.778 m) Weight(growth percentile) SpO2 BMI Smoking Status 266 lb (120.7 kg) 94% 38.17 kg/m2 Former Smoker Vitals History BMI and BSA Data Body Mass Index Body Surface Area  
 38.17 kg/m 2 2.44 m 2 Preferred Pharmacy Pharmacy Name Phone Renetta [facility] @ [address] [address] Joslyn Paget [phone] Your Updated Medication List  
  
   
This list is accurate as of 5/9/18  2:56 PM.  Always use your most recent med list.  
  
  
  
  
 COUMADIN 5 mg tablet Generic drug:  warfarin Take 7.5 mg by mouth daily. furosemide 40 mg tablet Commonly known as:  LASIX Take  by mouth daily. glimepiride 2 mg tablet Commonly known as:  AMARYL Take  by mouth every morning. lisinopril 5 mg tablet Commonly known as:  Hammer Carey Take  by mouth daily. Indications: HYPERTENSION  
  
 metFORMIN 500 mg tablet Commonly known as:  GLUCOPHAGE Take  by mouth daily (with breakfast). metoprolol succinate 25 mg XL tablet Commonly known as:  TOPROL-XL TK 1 T PO D  
  
 naloxone 4 mg/actuation nasal spray Commonly known as:  [employer] Use 1 spray intranasally into 1 nostril. Use a new Narcan nasal spray for subsequent doses and administer into alternating nostrils. May repeat every 2 to 3 minutes as needed. oxyCODONE IR 15 mg immediate release tablet Commonly known as:  OXY-IR Take 1 Tab by mouth every four (4) hours as needed for Pain. Max Daily Amount: 90 mg. Indications: Pain  
  
 pioglitazone 30 mg tablet Commonly known as:  ACTOS TK 1 T PO  D  
  
 * potassium chloride SR 10 mEq tablet Commonly known as:  KLOR-CON 10 Take  by mouth Every Mon, Wed & Sun.  
  
 * potassium chloride SA 10 mEq capsule Commonly known as:  MICRO-K  
TK 1 C PO D  
  
 sofosbuvir-velpatas-voxilaprev 400-100-100 mg Tab Commonly known as:  Pily  Take 1 Tab by mouth daily for 84 days. Indications: CHRONIC HEPATITIS C - GENOTYPE 2  
  
 spironolactone 25 mg tablet Commonly known as:  ALDACTONE Take  by mouth daily. tamsulosin 0.4 mg capsule Commonly known as:  FLOMAX Take 0.4 mg by mouth daily. * Notice: This list has 2 medication(s) that are the same as other medications prescribed for you. Read the directions carefully, and ask your doctor or other care provider to review them with you. Prescriptions Printed  Refills  
 oxyCODONE IR (OXY-IR) 15 mg immediate release tablet 0  
 Sig: Take 1 Tab by mouth every four (4) hours as needed for Pain. Max Daily Amount: 90 mg. Indications: Pain Class: Print Route: Oral  
  
Prescriptions Sent to Pharmacy Refills  
 sofosbuvir-velpatas-voxilaprev (VOSEVI) 400-100-100 mg tab 2 Sig: Take 1 Tab by mouth daily for 84 days. Indications: CHRONIC HEPATITIS C - GENOTYPE 2 Class: Normal  
 Pharmacy: Renetta Ferguson3 @ [address] [facility] #: [phone] Route: Oral  
  
Follow-up Instructions Return in about 8 weeks (around 7/4/2018). To-Do List   
 05/09/2018 Lab:  AFP WITH AFP-L3% 05/09/2018 Lab:  CBC WITH AUTOMATED DIFF   
  
 05/09/2018 Lab:  HCV RNA BY KARLA QL,RFLX TO QT   
  
 05/09/2018 Lab:  HEPATIC FUNCTION PANEL   
  
 05/09/2018 Lab:  METABOLIC PANEL, BASIC   
  
 05/09/2018 Lab:  PROTHROMBIN TIME + INR Introducing \[facility] [place] [facility]\"" & [facility] SERVICES! Dear Mary Cadena: 
Thank you for requesting a REHAPP account. Our records indicate that you already have an active REHAPP account. You can access your account anytime at [email or web address] Did you know that you can access your hospital and ER discharge instructions at any time in REHAPP? You can also review all of your test results from your hospital stay or ER visit. Additional Information If you have questions, please visit the Frequently Asked Questions section of the REHAPP website at [email or web address] Remember, REHAPP is NOT to be used for urgent needs. For medical emergencies, dial 911. Now available from your iPhone and Android! Please provide this summary of care documentation to your next provider. Your primary care clinician is listed as Gabby Caro. If you have any questions after today's visit, please call [phone].

## 2018-05-09 NOTE — PROGRESS NOTES
[address] Ross MD, [address], [facility] [place]       Leonora Estimable, NP    Pam Benitez, LINH Deras, ACNP-BC   Charlene Ingram, HANY Luna NP        at [address], [address] [address]     [phone]     FAX: [phone]    at [address], [address]     [phone]     FAX: [phone]       Patient Care Team:  Rebeca Mims MD as PCP - General (Unknown Physician Specialty)  Mariah Dixon MD (Gastroenterology)  Funmilayo Gregory MD (Radiology)      Problem List  Date Reviewed: 11/8/2017          Codes Class Noted    Hepatocellular carcinoma ([facility] ICD-10-CM: C22.0  ICD-9-CM: 155.0  9/6/2017        A-fib ([facility] ICD-10-CM: I48.91  ICD-9-CM: 427.31  11/30/2015        Cirrhosis ([facility] [address] [facility] ICD-10-CM: K74.60  ICD-9-CM: 571.5  6/30/2015        Elevated prostate specific antigen (PSA) ICD-10-CM: R97.20  ICD-9-CM: 790.93  5/10/2014        H/O resection of liver ICD-10-CM: Z90.49  ICD-9-CM: V15.29  12/24/2012    Overview Signed 12/24/2012  1:22 AM by Alexys Greer MD     Partial right lobectomy. Reason for this is not known. Chronic hepatitis C ([facility] ICD-10-CM: B18.2  ICD-9-CM: 070.54  12/5/2012    Overview Addendum 6/30/2016  9:26 AM by Alexys Greer MD     Peginterferon/ribavirin ~9596. Treated 6 months.   SOF/RBV relapse             Hypertension ICD-10-CM: I10  ICD-9-CM: 401.9  12/5/2012        PAT (paroxysmal atrial tachycardia) ([facility] [address] [facility] ICD-10-CM: I47.1  ICD-9-CM: 427.0  12/5/2012    Overview Signed 12/5/2012  2:12 PM by Alexys Greer MD     Ablation 2002             Colon cancer [facility]) ICD-10-CM: C18.9  ICD-9-CM: 153.9  12/5/2012    Overview Signed 12/5/2012  2:13 PM by Alexys Greer MD     Partial colectomy 2004             S/P knee surgery ICD-10-CM: L22.376  ICD-9-CM: V45.89 12/5/2012    Overview Signed 12/5/2012  2:14 PM by Keila Peralta MD     2010                   Nuria Ivey. returns to the [address] for management of cirrhosis secondary to chronic HCV. Mr. Nicole Zambrano has a recently discovered liver cancer. The active problem list, all pertinent past medical history, medications, liver histology, endoscopic studies, radiologic findings and laboratory findings related to the liver disorder were reviewed with the patient. The patient is a 74 y.o.  male who first found out he had HCV in the early 2000s when he was living in [place]. He was most likely exposed to [address] [facility] [facility] [address] in the 1970s when he had an episode of acute icteric hepatitis. While living in [place] he was treated with peginterferon and ribavirin in the 2000s. He was treated with SOF and RBV for 12 weeks in 2015. He failed to achieve SVR with these treatments. He was treated with Epclusa and bid Ribavirin between 7/24/2016 -10/15/2016. He completed the prescribed 12 weeks of therapy. He had a virologic response but never became HCV RNA undetectable. The patient underwent a right lobe segmental liver resection while living in [place]. The pathology is not known but the patient states he was told this was benign. Imaging of the liver was performed in 08/2017 with CT. Likely infiltrative lesion in segment 6 and 7 of the right hepatic lobe. This has been treated with Y-90 in 09/2017. MRI in 11/2017 demonstrated a good early response. A Fibroscan demonstrated cirrhosis with a value of 18.8 kPa    The patient notes fatigue, pain in the right side over the liver, and swelling of the lower extremities. The pain is getting worse. The patient has not experienced problems concentrating, swelling of the abdomen, hematemesis, hematochezia.     The patient has Moderate limitations in functional activities which can be attributed to the liver disease and to other medical problems that are not related to the liver disease. ALLERGIES:  No Known Allergies    MEDICATIONS  Current Outpatient Prescriptions   Medication Sig    oxyCODONE IR (OXY-IR) 15 mg immediate release tablet Take 1 Tab by mouth every four (4) hours as needed for Pain. Max Daily Amount: 90 mg. Indications: Pain    naloxone (NARCAN) 4 mg/actuation spry Use 1 spray intranasally into 1 nostril. Use a new Narcan nasal spray for subsequent doses and administer into alternating nostrils. May repeat every 2 to 3 minutes as needed.  metoprolol succinate (TOPROL-XL) 25 mg XL tablet TK 1 T PO D    pioglitazone (ACTOS) 30 mg tablet TK 1 T PO  D    potassium chloride SA (MICRO-K) 10 mEq capsule TK 1 C PO D    potassium chloride SR (KLOR-CON 10) 10 mEq tablet Take  by mouth Every Mon, Wed & Sun.    furosemide (LASIX) 40 mg tablet Take  by mouth daily.  tamsulosin (FLOMAX) 0.4 mg capsule Take 0.4 mg by mouth daily.  lisinopril (PRINIVIL, ZESTRIL) 5 mg tablet Take  by mouth daily. Indications: HYPERTENSION    glimepiride (AMARYL) 2 mg tablet Take  by mouth every morning.  metFORMIN (GLUCOPHAGE) 500 mg tablet Take  by mouth daily (with breakfast).  spironolactone (ALDACTONE) 25 mg tablet Take  by mouth daily.  warfarin (COUMADIN) 5 mg tablet Take 7.5 mg by mouth daily. No current facility-administered medications for this visit. SYSTEM REVIEW NOT RELATED TO LIVER DISEASE OR REVIEWED ABOVE:  Constitution systems: Negative for fever, chills, weight gain, weight loss. Eyes: Negative for visual changes. ENT: Negative for sore throat, painful swallowing. Respiratory: Negative for cough, hemoptysis, SOB. Cardiology: Negative for chest pain, palpitations. GI:  Negative for constipation or diarrhea. Positive for low right abdominal pain. : Negative for urinary frequency, dysuria, hematuria, nocturia. Skin: Negative for rash. Hematology: Negative for easy bruising, blood clots. Musculo-skeletal: Negative for back pain, muscle pain, weakness. Neurologic: Negative for headaches, dizziness, vertigo, memory problems not related to HE. Psychology: Negative for anxiety, depression. FAMILY HISTORY  There is no family members with HCV or liver disease    SOCIAL HISTORY:  The patient is . The patient has 3 children, 7 grandchildren. The patient has never used tobacco products. The patient has been abstinent from alcohol since 1970s. The patient used to work as . The patient retired in 2005. PHYSICAL EXAMINATION:  Visit Vitals    BP (!) 151/92 (BP 1 Location: Right arm, BP Patient Position: Sitting)    Pulse 72    Temp 97.8 °F (36.6 °C) (Tympanic)    Resp 20    Ht 5' 10\" (1.778 m)    Wt 266 lb (120.7 kg)    SpO2 94%    BMI 38.17 kg/m2     General: No acute distress. Eyes: Sclera anicteric. ENT: No oral lesions. Thyroid normal.  Nodes: No adenopathy. Skin: No spider angiomata. No jaundice. No palmar erythema. Respiratory: Lungs clear to auscultation. Cardiovascular: Regular heart rate. No murmurs. No JVD. Abdomen: Soft non-tender. Liver size normal to percussion/palpation. Spleen not palpable. No obvious ascites. Extremities: No lower edema. No muscle wasting. No gross arthritic changes. Neurologic: Alert and oriented. Cranial nerves grossly intact. No asterixsis.     LABORATORY STUDIES:   Liver [place] of [address] Units 5/9/2018 9/25/2017 9/15/2017   WBC 3.4 - 10.8 x10E3/uL 4.6 5.5 4.9   ANC 1.4 - 7.0 x10E3/uL 3.3     HGB 13.0 - 17.7 g/dL 14.5 14.0 13.4    - 379 x10E3/uL 169 160 127 (L)   INR 0.8 - 1.2 2.3 (H) 1.0 1.1   AST 0 - 40 IU/L 48 (H) 59 (H) 54 (H)   ALT 0 - 44 IU/L 41 77 (H) 80 (H)   Alk Phos 39 - 117 IU/L 53 79 62   Bili, Total 0.0 - 1.2 mg/dL 0.7 0.5 0.6   Bili, Direct 0.00 - 0.40 mg/dL 0.21 0.1 0.2   Albumin 3.5 - 4.8 g/dL 4.1 3.3 (L) 3.3 (L)   BUN 8 - 27 mg/dL 14 22 (H) 21 (H)   Creat 0.76 - 1.27 mg/dL 0.85 0.84 0.80   Na 134 - 144 mmol/L 140 139 141   K 3.5 - 5.2 mmol/L 4.2 4.3 4.1   Cl 96 - 106 mmol/L 102 107 107   CO2 18 - 29 mmol/L 24 27 28   Glucose 65 - 99 mg/dL 113 (H) 103 (H) 96     CANCER SCREENING:  Cancer Screening  9/6/2017 6/28/2017 5/2/2017   AFP, Serum  27.5 (H)  46.7 (H) 79.8 (H)   AFP-L3%  29.7 (H)  14.5 (H) 12.4 (H)     SEROLOGIES:  8/2012. HBA1C 6.7    Serologies Latest Ref Rng 5/19/2015 12/5/2012   Hep A Ab, Total Negative  Positive (A)   Hep B Surface Ag Negative  Negative   Hep B Core Ab, Total Negative  Positive (A)   Hep B Surface Ab 0.00 - 0.99 Index Value  0.15   Hep C Genotype  2 CANCELED   HCV RT-PCR, Quant  [id number]    IL28B Genotype   TT genotype     5/2015. HCV RNA Positive  10/2015. HCV RNA undetectable  12/2015. HCV RNA log 6.7 intU  5/2016. HCV RNA Log 6.8 intU  6/2016. HCV RNA positive  8/2016. Undetectable  11/2016.  690 intU  5/2017. HCV RNA Log 7 intU, HCV genotype 2    LIVER HISTOLOGY:  6/2015. FibroScan performed at [address]. EkPa was 18.8. Suggested fibrosis level is F4.  08/2017. Liver biopsy by Dr. Jp Lal. Suspicious for hepatocellular carcinoma, but there is simply insufficient quantity of material for a firm diagnosis. ENDOSCOPIC PROCEDURES:  8/2015. EGD by Dr. Javier Jacobo. Small esophageal varices. No banding performed. Repeat in 2 years. RADIOLOGY:  12/2012. Ultrasound of liver. Echogenic consistent with cirrhosis. 4.5x3.6 cm right lobe complex mass lesion. No dilated bile ducts. No ascites. 12/2012. CT scan abdomen with and without IV contrast.  Evidence of prior right lobe resection. The liver otherwise appears normal.  No liver mass lesions. No dilated bile ducts. No bile duct strictures. No ascites. 6/2015. Ultrasound of liver. Echogenic consistent with chronic liver disease. No liver mass lesions. No dilated bile ducts. No ascites. Post surgical changes. 5/2016.   Ultrasound of liver.   Echogenic consistent with cirrhosis. No liver mass lesions. No dilated bile ducts. No ascites. 2017. Dynamic MRI of the abdomen. Consistent with cirrhosis. 2.8 cm enhancing subcapsular mass right hepatic lobe. Also 15 mm solid mass. 2017. Abdominal CT w/wo contrast.  Likely infiltrative lesion in segment 6 and 7 of the right hepatic lobe. 2017. MRI. \"Good early response\". 2018. Liver/Pelvic Ct w/wo IV contrast.  Large infiltrative hepatocellular carcinoma with findings suggesting interval treatment.  Presence of residual tumor is equivocal.    OTHER TESTIN2017. Liver biopsy. The EMR in this case has been reviewed. The clinical story is quite compelling for a consideration of hepatocellular carcinoma. The biopsy sample does not contain enough of the atypical material for a firm diagnosis. ASSESSMENT AND PLAN:  Chronic HCV with cirrhosis, HCC and chronic hepatitis C. The most recent laboratory studies indicate that the AST is elevated, the ALT is normal, alkaline phosphatase is normal, tests of hepatic synthetic and metabolic function are normal, and the platelet count is normal.      Complications of cirrhosis were discussed in detail. We discussed thrombocytopenia, portal hypertension, varices, GI bleeding, peripheral edema, ascites, hepatic encephalopathy, and hepatocellular carcinoma. We discussed the need for follow ups on a regular basis, at 3 month intervals to monitor for complications. We discussed the need for every 6 month liver imaging studies. The patient underwent Y90 therasphere radioembolization of right hepatic lobe tumor on 2017 by Dr. Julian Edwards. The patient returned to Dr. Ky Dhaliwal one month after the procedure to have imaging completed, but \"freaked out\" during MRI and the imaging could not be completed. It appears that the patient did eventually have MRI completed in 2017 and the demonstrated a good early response to the Y-90. The patient recently returned from [place] and is re-establishing care. Our NN provided him with contact information for Dr. Julian Edwards. He is to call that office and arrange a follow up as indicate. He is to take the imaging whiich was obtained at [facility] with him for Dr. Ky Dhaliwal review. The patient is complaining severe right upper quadrant pain. 15 mg oxycodone IR provided. He already has the Naloxone 4 mg nasal spray. The patient was previously treated for HCV with several different therapies including PEGINF and RBV, SOF and RBV, and most recently Epclusa and RBV. He has not achieved SVR. The patient has HCV genotype 2. Claryce Chayo is a fixed dose combination of sofosbuvir (a polymerase inhibitor), velpatasvir (an NS5A inhibitor), and voxilaprevir (an NS3/4A protease inhibitor). The regime if for one tablet a day for 12 weeks. The cure rate for genotype 1A patient's is 96%. This was ordered today through [facility] . Educational material was provided. I explained to the patient that I have ordered the Vosevi through our specialty pharmacy and that if his insurance carrier approves of the ordered regime, the medication will be delivered to his home in three separate shipments of 28 doses each. He may begin taking the medication as soon as it arrives. He is to inform this office when he starts the Vosevi. The patient was directed to continue all current medications at the current dosages. There are no contraindications for the patient to take any medications that are necessary for treatment of other medical issues. The patient was instructed not to start any PPI while on treatment. The patient was instructed not to take any antacids within 4 hours of taking the Harvoni. The patient verbalized understanding of these instructions. The patient was counseled regarding alcohol consumption. Vaccination for viral hepatitis A and B is not needed. The patient has serologic evidence of prior exposure or vaccination with immunity. All of the above issues were discussed with the patient. All questions were answered. The patient expressed a clear understanding of the above. 30 minutes total time spent with this patient with more than 50% of this time spent counseling and coordinating care as described above. [address] [address] as soon as he can upon return from [place].       Kelvin Martin NP   [facility] [place] of [facility], [address] [place] [address]   [phone]

## 2018-05-14 LAB
AFP L3 MFR SERPL: 9.9 % (ref 0–9.9)
AFP SERPL-MCNC: 5.4 NG/ML (ref 0–8)
ALBUMIN SERPL-MCNC: 4.1 G/DL (ref 3.5–4.8)
ALP SERPL-CCNC: 53 IU/L (ref 39–117)
ALT SERPL-CCNC: 41 IU/L (ref 0–44)
AST SERPL-CCNC: 48 IU/L (ref 0–40)
BASOPHILS # BLD AUTO: 0 X10E3/UL (ref 0–0.2)
BASOPHILS NFR BLD AUTO: 0 %
BILIRUB DIRECT SERPL-MCNC: 0.21 MG/DL (ref 0–0.4)
BILIRUB SERPL-MCNC: 0.7 MG/DL (ref 0–1.2)
BUN SERPL-MCNC: 14 MG/DL (ref 8–27)
BUN/CREAT SERPL: 16 (ref 10–24)
CALCIUM SERPL-MCNC: 9.5 MG/DL (ref 8.6–10.2)
CHLORIDE SERPL-SCNC: 102 MMOL/L (ref 96–106)
CO2 SERPL-SCNC: 24 MMOL/L (ref 18–29)
CREAT SERPL-MCNC: 0.85 MG/DL (ref 0.76–1.27)
EOSINOPHIL # BLD AUTO: 0.1 X10E3/UL (ref 0–0.4)
EOSINOPHIL NFR BLD AUTO: 2 %
ERYTHROCYTE [DISTWIDTH] IN BLOOD BY AUTOMATED COUNT: 14.3 % (ref 12.3–15.4)
GFR SERPLBLD CREATININE-BSD FMLA CKD-EPI: 86 ML/MIN/1.73
GFR SERPLBLD CREATININE-BSD FMLA CKD-EPI: 99 ML/MIN/1.73
GLUCOSE SERPL-MCNC: 113 MG/DL (ref 65–99)
HCT VFR BLD AUTO: 42.3 % (ref 37.5–51)
HCV RNA SERPL NAA+PROBE-ACNC: NORMAL IU/ML
HCV RNA SERPL NAA+PROBE-LOG IU: NORMAL LOG10 IU/ML
HCV RNA SERPL QL NAA+PROBE: POSITIVE
HGB BLD-MCNC: 14.5 G/DL (ref 13–17.7)
IMM GRANULOCYTES # BLD: 0.1 X10E3/UL (ref 0–0.1)
IMM GRANULOCYTES NFR BLD: 1 %
INR PPP: 2.3 (ref 0.8–1.2)
LYMPHOCYTES # BLD AUTO: 0.4 X10E3/UL (ref 0.7–3.1)
LYMPHOCYTES NFR BLD AUTO: 10 %
MCH RBC QN AUTO: 34.5 PG (ref 26.6–33)
MCHC RBC AUTO-ENTMCNC: 34.3 G/DL (ref 31.5–35.7)
MCV RBC AUTO: 101 FL (ref 79–97)
MONOCYTES # BLD AUTO: 0.7 X10E3/UL (ref 0.1–0.9)
MONOCYTES NFR BLD AUTO: 16 %
NEUTROPHILS # BLD AUTO: 3.3 X10E3/UL (ref 1.4–7)
NEUTROPHILS NFR BLD AUTO: 71 %
PLATELET # BLD AUTO: 169 X10E3/UL (ref 150–379)
POTASSIUM SERPL-SCNC: 4.2 MMOL/L (ref 3.5–5.2)
PROT SERPL-MCNC: 7.6 G/DL (ref 6–8.5)
PROTHROMBIN TIME: 23.2 SEC (ref 9.1–12)
RBC # BLD AUTO: 4.2 X10E6/UL (ref 4.14–5.8)
SODIUM SERPL-SCNC: 140 MMOL/L (ref 134–144)
TEST INFORMATION: NORMAL
WBC # BLD AUTO: 4.6 X10E3/UL (ref 3.4–10.8)

## 2018-06-18 ENCOUNTER — HOSPITAL ENCOUNTER (OUTPATIENT)
Dept: LAB | Age: 73
Discharge: HOME | End: 2018-06-18

## 2018-06-18 DIAGNOSIS — C22.0 HEPATOCELLULAR CARCINOMA (HCC): ICD-10-CM

## 2018-06-18 DIAGNOSIS — B18.2 CHRONIC HEPATITIS C WITHOUT HEPATIC COMA (HCC): Primary | ICD-10-CM

## 2018-06-18 PROCEDURE — 99001 SPECIMEN HANDLING PT-LAB: CPT | Performed by: NURSE PRACTITIONER

## 2018-06-18 RX ORDER — OXYCODONE HYDROCHLORIDE 15 MG/1
15 TABLET ORAL
Qty: 90 TAB | Refills: 0 | Status: SHIPPED | OUTPATIENT
Start: 2018-06-18 | End: 2018-07-23 | Stop reason: SDUPTHER

## 2018-06-21 LAB
AFP L3 MFR SERPL: 9.2 % (ref 0–9.9)
AFP SERPL-MCNC: 5 NG/ML (ref 0–8)
ALBUMIN SERPL-MCNC: 4 G/DL (ref 3.5–4.8)
ALP SERPL-CCNC: 57 IU/L (ref 39–117)
ALT SERPL-CCNC: 47 IU/L (ref 0–44)
AST SERPL-CCNC: 46 IU/L (ref 0–40)
BASOPHILS # BLD AUTO: 0 X10E3/UL (ref 0–0.2)
BASOPHILS NFR BLD AUTO: 0 %
BILIRUB DIRECT SERPL-MCNC: 0.2 MG/DL (ref 0–0.4)
BILIRUB SERPL-MCNC: 0.8 MG/DL (ref 0–1.2)
BUN SERPL-MCNC: 15 MG/DL (ref 8–27)
BUN/CREAT SERPL: 22 (ref 10–24)
CALCIUM SERPL-MCNC: 9.3 MG/DL (ref 8.6–10.2)
CHLORIDE SERPL-SCNC: 103 MMOL/L (ref 96–106)
CO2 SERPL-SCNC: 23 MMOL/L (ref 20–29)
CREAT SERPL-MCNC: 0.69 MG/DL (ref 0.76–1.27)
EOSINOPHIL # BLD AUTO: 0.1 X10E3/UL (ref 0–0.4)
EOSINOPHIL NFR BLD AUTO: 2 %
ERYTHROCYTE [DISTWIDTH] IN BLOOD BY AUTOMATED COUNT: 13.9 % (ref 12.3–15.4)
GFR SERPLBLD CREATININE-BSD FMLA CKD-EPI: 108 ML/MIN/1.73
GFR SERPLBLD CREATININE-BSD FMLA CKD-EPI: 94 ML/MIN/1.73
GLUCOSE SERPL-MCNC: 161 MG/DL (ref 65–99)
HCT VFR BLD AUTO: 41.9 % (ref 37.5–51)
HCV GENTYP SERPL NAA+PROBE: NORMAL
HCV RNA SERPL NAA+PROBE-ACNC: NORMAL IU/ML
HCV RNA SERPL NAA+PROBE-LOG IU: 6.78 LOG10 IU/ML
HGB BLD-MCNC: 13.7 G/DL (ref 13–17.7)
IMM GRANULOCYTES # BLD: 0.1 X10E3/UL (ref 0–0.1)
IMM GRANULOCYTES NFR BLD: 1 %
INR PPP: 1.5 (ref 0.8–1.2)
LYMPHOCYTES # BLD AUTO: 0.5 X10E3/UL (ref 0.7–3.1)
LYMPHOCYTES NFR BLD AUTO: 11 %
MCH RBC QN AUTO: 33.7 PG (ref 26.6–33)
MCHC RBC AUTO-ENTMCNC: 32.7 G/DL (ref 31.5–35.7)
MCV RBC AUTO: 103 FL (ref 79–97)
MONOCYTES # BLD AUTO: 0.7 X10E3/UL (ref 0.1–0.9)
MONOCYTES NFR BLD AUTO: 13 %
NEUTROPHILS # BLD AUTO: 3.7 X10E3/UL (ref 1.4–7)
NEUTROPHILS NFR BLD AUTO: 73 %
PLATELET # BLD AUTO: 166 X10E3/UL (ref 150–379)
PLEASE NOTE, 550474: NORMAL
POTASSIUM SERPL-SCNC: 4 MMOL/L (ref 3.5–5.2)
PROT SERPL-MCNC: 7.2 G/DL (ref 6–8.5)
PROTHROMBIN TIME: 15.8 SEC (ref 9.1–12)
RBC # BLD AUTO: 4.06 X10E6/UL (ref 4.14–5.8)
SODIUM SERPL-SCNC: 142 MMOL/L (ref 134–144)
TEST INFORMATION: NORMAL
WBC # BLD AUTO: 5.1 X10E3/UL (ref 3.4–10.8)

## 2018-07-23 ENCOUNTER — OFFICE VISIT (OUTPATIENT)
Dept: HEMATOLOGY | Age: 73
End: 2018-07-23

## 2018-07-23 VITALS
DIASTOLIC BLOOD PRESSURE: 88 MMHG | TEMPERATURE: 96.1 F | OXYGEN SATURATION: 98 % | BODY MASS INDEX: 36.7 KG/M2 | HEIGHT: 70 IN | WEIGHT: 256.38 LBS | HEART RATE: 84 BPM | SYSTOLIC BLOOD PRESSURE: 126 MMHG

## 2018-07-23 DIAGNOSIS — C22.0 HEPATOCELLULAR CARCINOMA (HCC): ICD-10-CM

## 2018-07-23 PROBLEM — E66.01 SEVERE OBESITY (BMI 35.0-39.9): Status: ACTIVE | Noted: 2018-07-23

## 2018-07-23 RX ORDER — OXYCODONE HYDROCHLORIDE 15 MG/1
15 TABLET ORAL
Qty: 90 TAB | Refills: 0 | Status: SHIPPED | OUTPATIENT
Start: 2018-07-23 | End: 2018-09-06 | Stop reason: SDUPTHER

## 2018-07-23 NOTE — PROGRESS NOTES
[address] Jarod Ross MD, [address], [place], [place]       Ann Bower, NP    LINH Szymanski, [facility]   Khris Stanley, HANY Moe NP        at [facility]     [address] [facility], [address] [address]     [phone]     FAX: [phone]    at [address], [address] [facility], [address]     [phone]     FAX: [phone]       Patient Care Team:  Adrianne Can MD as PCP - General (Unknown Physician Specialty)  Ceasar Szymanski MD (Radiology)      Problem List  Date Reviewed: 7/23/2018          Codes Class Noted    Severe obesity (BMI 35.0-39.9) ([facility] ICD-10-CM: E66.01  ICD-9-CM: 278.01  7/23/2018        Hepatocellular carcinoma ([facility] ICD-10-CM: C22.0  ICD-9-CM: 155.0  9/6/2017        A-fib ([facility] ICD-10-CM: I48.91  ICD-9-CM: 427.31  11/30/2015        Cirrhosis ([facility] ICD-10-CM: K74.60  ICD-9-CM: 571.5  6/30/2015        Elevated prostate specific antigen (PSA) ICD-10-CM: R97.20  ICD-9-CM: 790.93  5/10/2014        H/O resection of liver ICD-10-CM: Z90.49  ICD-9-CM: V15.29  12/24/2012    Overview Signed 12/24/2012  1:22 AM by Abi Brambila MD     Partial right lobectomy. Reason for this is not known. Chronic hepatitis C ([facility] ICD-10-CM: B18.2  ICD-9-CM: 070.54  12/5/2012    Overview Addendum 6/30/2016  9:26 AM by Abi Brambila MD     Peginterferon/ribavirin ~7699. Treated 6 months.   SOF/RBV relapse             Hypertension ICD-10-CM: I10  ICD-9-CM: 401.9  12/5/2012        PAT (paroxysmal atrial tachycardia) ([facility] ICD-10-CM: I47.1  ICD-9-CM: 427.0  12/5/2012    Overview Signed 12/5/2012  2:12 PM by Abi Brambila MD     Ablation 2002             Colon cancer [facility]) ICD-10-CM: C18.9  ICD-9-CM: 153.9  12/5/2012    Overview Signed 12/5/2012  2:13 PM by Abi Brambila MD     Partial colectomy 2004             S/P knee surgery ICD-10-CM: Z98.890  ICD-9-CM: V45.89  12/5/2012    Overview Signed 12/5/2012  2:14 PM by Sander Ramos MD     2010                   Esther Barton returns to the [facility] for management of cirrhosis secondary to chronic HCV. Mr. Dave Alicia has a  liver cancer whichis being treated by Dr. Ramiro Soria. The active problem list, all pertinent past medical history, medications, liver histology, endoscopic studies, radiologic findings and laboratory findings related to the liver disorder were reviewed with the patient. The patient is a 74 y.o.  male who first found out he had HCV in the early 2000s when he was living in [place]. He was most likely exposed to [address] in the 1970s when he had an episode of acute icteric hepatitis. While living in [place] he was treated with peginterferon and ribavirin in the 2000s. He was treated with SOF and RBV for 12 weeks in 2015. He failed to achieve SVR with these treatments. He was treated with Epclusa and bid Ribavirin between 7/24/2016 -10/15/2016. He completed the prescribed 12 weeks of therapy. He had a virologic response but never became HCV RNA undetectable. The patient underwent a right lobe segmental liver resection while living in [place]. The pathology is not known but the patient states he was told this was benign. Imaging of the liver was performed in 08/2017 with CT. Likely infiltrative lesion in segment 6 and 7 of the right hepatic lobe. This has been treated with Y-90 in 09/2017. MRI in 11/2017 demonstrated a good early response. Imaging has been repeated in 04/2018 with CT and in 06/2018 with MRI. No changes, stable post Y-90 changes. No new lesions. A Fibroscan demonstrated cirrhosis with a value of 18.8 kPa    The patient notes fatigue, severe pain in the right side over the liver, and swelling of the lower extremities. He reports that the pain is getting worse.     The patient has not experienced problems concentrating, swelling of the abdomen, hematemesis, hematochezia. The patient has Moderate limitations in functional activities which can be attributed to the liver disease and to other medical problems that are not related to the liver disease. ALLERGIES:  No Known Allergies    MEDICATIONS  Current Outpatient Prescriptions   Medication Sig    oxyCODONE IR (OXY-IR) 15 mg immediate release tablet Take 1 Tab by mouth every four (4) hours as needed for Pain. Max Daily Amount: 90 mg. Indications: Pain    sofosbuvir-velpatas-voxilaprev (VOSEVI) 400-100-100 mg tab Take 1 Tab by mouth daily for 84 days. Indications: CHRONIC HEPATITIS C - GENOTYPE 2    metoprolol succinate (TOPROL-XL) 25 mg XL tablet TK 1 T PO D    pioglitazone (ACTOS) 30 mg tablet TK 1 T PO  D    potassium chloride SA (MICRO-K) 10 mEq capsule TK 1 C PO D    potassium chloride SR (KLOR-CON 10) 10 mEq tablet Take  by mouth Every Mon, Wed & Sun.    furosemide (LASIX) 40 mg tablet Take  by mouth daily.  warfarin (COUMADIN) 5 mg tablet Take 7.5 mg by mouth daily.  lisinopril (PRINIVIL, ZESTRIL) 5 mg tablet Take  by mouth daily. Indications: HYPERTENSION    glimepiride (AMARYL) 2 mg tablet Take  by mouth every morning.  metFORMIN (GLUCOPHAGE) 500 mg tablet Take  by mouth daily (with breakfast).  spironolactone (ALDACTONE) 25 mg tablet Take  by mouth daily.  tamsulosin (FLOMAX) 0.4 mg capsule Take 0.4 mg by mouth daily. No current facility-administered medications for this visit. SYSTEM REVIEW NOT RELATED TO LIVER DISEASE OR REVIEWED ABOVE:  Constitution systems: Negative for fever, chills, weight gain, weight loss. Eyes: Negative for visual changes. ENT: Negative for sore throat, painful swallowing. Respiratory: Negative for cough, hemoptysis, SOB. Cardiology: Negative for chest pain, palpitations. GI:  Negative for constipation or diarrhea.   Positive for low right abdominal pain.  : Negative for urinary frequency, dysuria, hematuria, nocturia. Skin: Negative for rash. Hematology: Negative for easy bruising, blood clots. Musculo-skeletal: Negative for back pain, muscle pain, weakness. Neurologic: Negative for headaches, dizziness, vertigo, memory problems not related to HE. Psychology: Negative for anxiety, depression. FAMILY HISTORY  There is no family members with HCV or liver disease    SOCIAL HISTORY:  The patient is . The patient has 3 children, 7 grandchildren. The patient has never used tobacco products. The patient has been abstinent from alcohol since 1970s. The patient used to work as . The patient retired in 2005. PHYSICAL EXAMINATION:  Visit Vitals    /88    Pulse 84    Temp 96.1 °F (35.6 °C) (Tympanic)    Ht 5' 10\" (1.778 m)    Wt 256 lb 6 oz (116.3 kg)    SpO2 98%    BMI 36.79 kg/m2     General: No acute distress. Eyes: Sclera anicteric. ENT: No oral lesions. Thyroid normal.  Nodes: No adenopathy. Skin: No spider angiomata. No jaundice. No palmar erythema. Respiratory: Lungs clear to auscultation. Cardiovascular: Regular heart rate. No murmurs. No JVD. Abdomen: Soft non-tender. Liver size normal to percussion/palpation. Spleen not palpable. No obvious ascites. Extremities: No lower edema. No muscle wasting. No gross arthritic changes. Neurologic: Alert and oriented. Cranial nerves grossly intact. No asterixsis.     LABORATORY STUDIES:   Liver [place] of [address] Units 6/18/2018 5/9/2018 9/25/2017   WBC 3.4 - 10.8 x10E3/uL 5.1 4.6 5.5   ANC 1.4 - 7.0 x10E3/uL 3.7 3.3    HGB 13.0 - 17.7 g/dL 13.7 14.5 14.0    - 379 x10E3/uL 166 169 160   INR 0.8 - 1.2 1.5 (H) 2.3 (H) 1.0   AST 0 - 40 IU/L 46 (H) 48 (H) 59 (H)   ALT 0 - 44 IU/L 47 (H) 41 77 (H)   Alk Phos 39 - 117 IU/L 57 53 79   Bili, Total 0.0 - 1.2 mg/dL 0.8 0.7 0.5   Bili, Direct 0.00 - 0.40 mg/dL 0.20 0.21 0.1 Albumin 3.5 - 4.8 g/dL 4.0 4.1 3.3 (L)   BUN 8 - 27 mg/dL 15 14 22 (H)   Creat 0.76 - 1.27 mg/dL 0.69 (L) 0.85 0.84   Na 134 - 144 mmol/L 142 140 139   K 3.5 - 5.2 mmol/L 4.0 4.2 4.3   Cl 96 - 106 mmol/L 103 102 107   CO2 20 - 29 mmol/L 23 24 27   Glucose 65 - 99 mg/dL 161 (H) 113 (H) 103 (H)     CANCER SCREENING:  Cancer Screening Latest Ref Rng & Units 6/18/2018 5/9/2018   AFP, Serum 0.0 - 8.0 ng/mL 5.0 5.4   AFP-L3% 0.0 - 9.9 % 9.2 9.9     Cancer Screening Latest Ref Rng & Units 9/6/2017 6/28/2017 5/2/2017   AFP, Serum 0.0 - 8.0 ng/mL 27.5 (H)  46.7 (H) 79.8 (H)   AFP-L3% 0.0 - 9.9 % 29.7 (H)  14.5 (H) 12.4 (H)       SEROLOGIES:  8/2012. HBA1C 6.7    Serologies Latest Ref Rng 5/19/2015 12/5/2012   Hep A Ab, Total Negative  Positive (A)   Hep B Surface Ag Negative  Negative   Hep B Core Ab, Total Negative  Positive (A)   Hep B Surface Ab 0.00 - 0.99 Index Value  0.15   Hep C Genotype  2 CANCELED   HCV RT-PCR, Quant  [id number]    IL28B Genotype   TT genotype     5/2015. HCV RNA Positive  10/2015. HCV RNA undetectable  12/2015. HCV RNA log 6.7 intU  5/2016. HCV RNA Log 6.8 intU  6/2016. HCV RNA positive  8/2016. Undetectable  11/2016.  690 intU  5/2017. HCV RNA Log 7 intU, HCV genotype 2    LIVER HISTOLOGY:  6/2015. [facility]. EkPa was 18.8. Suggested fibrosis level is F4.  08/2017. Liver biopsy by Dr. Apolonia Jogre. Suspicious for hepatocellular carcinoma, but there is simply insufficient quantity of material for a firm diagnosis. ENDOSCOPIC PROCEDURES:  8/2015. EGD by Dr. Mando Wells. Small esophageal varices. No banding performed. Repeat in 2 years. RADIOLOGY:  12/2012. Ultrasound of liver. Echogenic consistent with cirrhosis. 4.5x3.6 cm right lobe complex mass lesion. No dilated bile ducts. No ascites. 12/2012. CT scan abdomen with and without IV contrast.  Evidence of prior right lobe resection.   The liver otherwise appears normal.  No liver mass lesions. No dilated bile ducts. No bile duct strictures. No ascites. 2015. Ultrasound of liver. Echogenic consistent with chronic liver disease. No liver mass lesions. No dilated bile ducts. No ascites. Post surgical changes. 2016. Ultrasound of liver. Echogenic consistent with cirrhosis. No liver mass lesions. No dilated bile ducts. No ascites. 2017. Dynamic MRI of the abdomen. Consistent with cirrhosis. 2.8 cm enhancing subcapsular mass right hepatic lobe. Also 15 mm solid mass. 2017. Abdominal CT w/wo contrast.  Likely infiltrative lesion in segment 6 and 7 of the right hepatic lobe. 2017. MRI. \"Good early response\". 2018. Liver/Pelvic Ct w/wo IV contrast.  Large infiltrative hepatocellular carcinoma with findings suggesting interval treatment.  Presence of residual tumor is equivocal.    OTHER TESTIN2017. Liver biopsy. The EMR in this case has been reviewed. The clinical story is quite compelling for a consideration of hepatocellular carcinoma. The biopsy sample does not contain enough of the atypical material for a firm diagnosis. ASSESSMENT AND PLAN:  Chronic HCV with cirrhosis, HCC and chronic hepatitis C. The most recent laboratory studies indicate that the liver transaminases are elevated, alkaline phosphatase is normal, tests of hepatic synthetic and metabolic function are normal, and the platelet count is normal.      Complications of cirrhosis were discussed in detail. We discussed thrombocytopenia, portal hypertension, varices, GI bleeding, peripheral edema, ascites, hepatic encephalopathy, and hepatocellular carcinoma. We discussed the need for follow ups on a regular basis, at 3 month intervals to monitor for complications. We discussed the need for every 6 month liver imaging studies. The patient underwent Y90 therasphere radioembolization of right hepatic lobe tumor on 2017 by Dr. Dane Carlisle.   The patient returned to  [facility] one month after the procedure to have imaging completed, but \"freaked out\" during MRI and the imaging could not be completed. It appears that the patient did eventually have MRI completed in 11/2017 and the demonstrated a good early response to the Y-90. Imaging has been repeated in 04/2018 with CT and in 06/2018 with MRI. No changes, stable post Y-90 changes. No new lesions. The patient spends a lot of time in [place] and has established care there with [facility]. We have no reports from them. He was seen by Dr. Hima Alaniz in 06/2018 and will follow up with him in [place]. The patient continues to complain os severe right upper quadrant pain. He states that this is getting worse. 15 mg oxycodone IR provided. He already has the Naloxone 4 mg nasal spray. The patient was previously treated for HCV with several different therapies including PEGINF and RBV, SOF and RBV, and most recently Epclusa and RBV. He has not achieved SVR. The patient has HCV genotype 2. Ailyn Landrum is a fixed dose combination of sofosbuvir (a polymerase inhibitor), velpatasvir (an NS5A inhibitor), and voxilaprevir (an NS3/4A protease inhibitor). The regime if for one tablet a day for 12 weeks. The cure rate for genotype 1A patient's is 96%. This was ordered last month through [facility]. Educational material was provided. I explained to the patient that I have ordered the Vosevi through our specialty pharmacy and that if his insurance carrier approves of the ordered regime, the medication will be delivered to his home in three separate shipments of 28 doses each. He may begin taking the medication as soon as it arrives. He is to inform this office when he starts the Vosevi. The patient was directed to continue all current medications at the current dosages.   There are no contraindications for the patient to take any medications that are necessary for treatment of other medical issues. The patient was instructed not to start any PPI while on treatment. The patient was instructed not to take any antacids within 4 hours of taking the Harvoni. The patient verbalized understanding of these instructions. The patient was counseled regarding alcohol consumption. Vaccination for viral hepatitis A and B is not needed. The patient has serologic evidence of prior exposure or vaccination with immunity. All of the above issues were discussed with the patient. All questions were answered. The patient expressed a clear understanding of the above. 40 minutes total time spent with this patient with more than 50% of this time spent counseling and coordinating care as described above. [facility] 87 in 10 weeks. The patient should be on HCV treatment by then.      Paula Cristobal NP   Liver [place] of [address] [place] [address], [address] [place] [address] Venita Acosta, [address] [place] [address]   [phone]

## 2018-07-23 NOTE — MR AVS SNAPSHOT
[facility] 
 
 
 [address] [place] [address] 
[phone] Patient: Duane Hunter. MRN: [id number] GDT:20/80/4306 Visit Information Date & Time Provider Department Dept. Phone Encounter #  
 7/23/2018  4:00 PM HANY [address] [place] [address] [phone] Follow-up Instructions Return in about 10 weeks (around 10/1/2018). Your Appointments 10/4/2018 11:00 AM  
[place] [id number] [facility] ([facility]) Appt Note: 10wk f/u  
 [facility], [facility] [address] [facility] [address] Upcoming Health Maintenance Date Due DTaP/Tdap/Td series (1 - Tdap) 10/18/1964 FOBT Q 1 YEAR AGE 50-75 10/18/1993 ZOSTER VACCINE AGE 60> 8/18/2003 GLAUCOMA SCREENING Q2Y 10/18/2008 Pneumococcal 65+ High/Highest Risk (1 of 2 - PCV13) 10/18/2008 MEDICARE YEARLY EXAM 3/14/2018 Influenza Age 5 to Adult 8/1/2018 Allergies as of 7/23/2018  Review Complete On: 7/23/2018 By: Jarad Maed No Known Allergies Current Immunizations  Never Reviewed No immunizations on file. Not reviewed this visit You Were Diagnosed With   
  
 Codes Comments Hepatocellular carcinoma ([facility]     ICD-10-CM: C22.0 ICD-9-CM: 155.0 Vitals BP Pulse Temp Height(growth percentile) Weight(growth percentile) SpO2  
 126/88 84 96.1 °F (35.6 °C) (Tympanic) 5' 10\" (1.778 m) 256 lb 6 oz (116.3 kg) 98% BMI Smoking Status 36.79 kg/m2 Former Smoker Vitals History BMI and BSA Data Body Mass Index Body Surface Area  
 36.79 kg/m 2 2.4 m 2 Preferred Pharmacy Pharmacy Name Phone Renetta Webb @ [address] [facility], [address] [place] [address] Angelina Awan [phone] Your Updated Medication List  
  
   
This list is accurate as of 7/23/18  4:22 PM.  Always use your most recent med list.  
  
  
  
  
 COUMADIN 5 mg tablet Generic drug:  warfarin Take 7.5 mg by mouth daily. furosemide 40 mg tablet Commonly known as:  LASIX Take  by mouth daily. glimepiride 2 mg tablet Commonly known as:  AMARYL Take  by mouth every morning. lisinopril 5 mg tablet Commonly known as:  Merilynn Bowdoinham Take  by mouth daily. Indications: HYPERTENSION  
  
 metFORMIN 500 mg tablet Commonly known as:  GLUCOPHAGE Take  by mouth daily (with breakfast). metoprolol succinate 25 mg XL tablet Commonly known as:  TOPROL-XL TK 1 T PO D  
  
 oxyCODONE IR 15 mg immediate release tablet Commonly known as:  OXY-IR Take 1 Tab by mouth every four (4) hours as needed for Pain. Max Daily Amount: 90 mg. Indications: Pain  
  
 pioglitazone 30 mg tablet Commonly known as:  ACTOS TK 1 T PO  D  
  
 * potassium chloride SR 10 mEq tablet Commonly known as:  KLOR-CON 10 Take  by mouth Every Mon, Wed & Sun.  
  
 * potassium chloride SA 10 mEq capsule Commonly known as:  MICRO-K  
TK 1 C PO D  
  
 sofosbuvir-velpatas-voxilaprev 400-100-100 mg Tab Commonly known as:  Christine Denise Take 1 Tab by mouth daily for 84 days. Indications: CHRONIC HEPATITIS C - GENOTYPE 2  
  
 spironolactone 25 mg tablet Commonly known as:  ALDACTONE Take  by mouth daily. tamsulosin 0.4 mg capsule Commonly known as:  FLOMAX Take 0.4 mg by mouth daily. * Notice: This list has 2 medication(s) that are the same as other medications prescribed for you. Read the directions carefully, and ask your doctor or other care provider to review them with you. Prescriptions Printed Refills  
 oxyCODONE IR (OXY-IR) 15 mg immediate release tablet 0 Sig: Take 1 Tab by mouth every four (4) hours as needed for Pain. Max Daily Amount: 90 mg. Indications: Pain Class: Print Route: Oral  
  
Follow-up Instructions Return in about 10 weeks (around 10/1/2018). Introducing [facility] SERVICES! Dear Robert Bach: 
Thank you for requesting a [facility] [facility] account. Our records indicate that you already have an active [employer] [facility] account. You can access your account anytime at [email or web address] Did you know that you can access your hospital and ER discharge instructions at any time in [employer] [facility]? You can also review all of your test results from your hospital stay or ER visit. Additional Information If you have questions, please visit the Frequently Asked Questions section of the [employer] website at [email or web address] Remember, [employer] [facility] is NOT to be used for urgent needs. For medical emergencies, dial 911. Now available from your iPhone and Android! Please provide this summary of care documentation to your next provider. Your primary care clinician is listed as Marck Chu. If you have any questions after today's visit, please call [phone].

## 2018-09-06 DIAGNOSIS — C22.0 HEPATOCELLULAR CARCINOMA (HCC): ICD-10-CM

## 2018-09-06 RX ORDER — OXYCODONE HYDROCHLORIDE 15 MG/1
15 TABLET ORAL
Qty: 90 TAB | Refills: 0 | Status: SHIPPED | OUTPATIENT
Start: 2018-09-06 | End: 2018-10-04 | Stop reason: SDUPTHER

## 2018-09-06 NOTE — PROGRESS NOTES
Tahir Sánchez. started Vosevi on 09/03/2018. Pain medication provided. Follow up is scheduled for 10/04/2018.
Unknown

## 2018-10-04 ENCOUNTER — HOSPITAL ENCOUNTER (OUTPATIENT)
Dept: LAB | Age: 73
Discharge: HOME | End: 2018-10-04

## 2018-10-04 ENCOUNTER — OFFICE VISIT (OUTPATIENT)
Dept: HEMATOLOGY | Age: 73
End: 2018-10-04

## 2018-10-04 VITALS
HEIGHT: 70 IN | RESPIRATION RATE: 18 BRPM | DIASTOLIC BLOOD PRESSURE: 77 MMHG | OXYGEN SATURATION: 96 % | TEMPERATURE: 96.7 F | HEART RATE: 55 BPM | BODY MASS INDEX: 34.22 KG/M2 | SYSTOLIC BLOOD PRESSURE: 116 MMHG | WEIGHT: 239 LBS

## 2018-10-04 DIAGNOSIS — B18.2 CHRONIC HEPATITIS C WITHOUT HEPATIC COMA (HCC): Primary | ICD-10-CM

## 2018-10-04 DIAGNOSIS — C22.0 HEPATOCELLULAR CARCINOMA (HCC): ICD-10-CM

## 2018-10-04 LAB — XX-LABCORP SPECIMEN COL,LCBCF: NORMAL

## 2018-10-04 PROCEDURE — 99001 SPECIMEN HANDLING PT-LAB: CPT | Performed by: NURSE PRACTITIONER

## 2018-10-04 RX ORDER — OXYCODONE HYDROCHLORIDE 15 MG/1
15 TABLET ORAL
Qty: 90 TAB | Refills: 0 | Status: SHIPPED | OUTPATIENT
Start: 2018-10-04 | End: 2018-11-05 | Stop reason: SDUPTHER

## 2018-10-04 RX ORDER — ONDANSETRON 8 MG/1
8 TABLET, ORALLY DISINTEGRATING ORAL
Qty: 60 TAB | Refills: 3 | Status: SHIPPED | OUTPATIENT
Start: 2018-10-04 | End: 2018-10-23 | Stop reason: SDUPTHER

## 2018-10-04 NOTE — MR AVS SNAPSHOT
[address] [address] 
[phone] Patient: Tara Barreto. MRN: [id number] SKE:[id number] Visit Information Date & Time Provider Department Dept. Phone Encounter #  
 10/4/2018 11:00 AM HANY Hutson [address] [place] [address] [id number] Follow-up Instructions Return in about 4 weeks (around 11/1/2018). Upcoming Health Maintenance Date Due DTaP/Tdap/Td series (1 - Tdap) 10/18/1964 Shingrix Vaccine Age 50> (1 of 2) 10/18/1993 FOBT Q 1 YEAR AGE 50-75 10/18/1993 GLAUCOMA SCREENING Q2Y 10/18/2008 Pneumococcal 65+ High/Highest Risk (1 of 2 - PCV13) 10/18/2008 MEDICARE YEARLY EXAM 3/14/2018 Influenza Age 5 to Adult 8/1/2018 Allergies as of 10/4/2018  Review Complete On: 10/4/2018 By: Dafne Chau No Known Allergies Current Immunizations  Never Reviewed No immunizations on file. Not reviewed this visit You Were Diagnosed With   
  
 Codes Comments Chronic hepatitis C without hepatic coma (HCC)    -  Primary ICD-10-CM: B18.2 ICD-9-CM: 070.54 Hepatocellular carcinoma ([facility]     ICD-10-CM: C22.0 ICD-9-CM: 155.0 Vitals BP Pulse Temp Resp Height(growth percentile) 116/77 (BP 1 Location: Right arm, BP Patient Position: Sitting) (!) 55 96.7 °F (35.9 °C) (Tympanic) 18 5' 10\" (1.778 m) Weight(growth percentile) SpO2 BMI Smoking Status 239 lb (108.4 kg) 96% 34.29 kg/m2 Former Smoker BMI and BSA Data Body Mass Index Body Surface Area  
 34.29 kg/m 2 2.31 m 2 Preferred Pharmacy Pharmacy Name Phone [facility] @ [address] [address] [address] [place] [phone] Your Updated Medication List  
  
   
This list is accurate as of 10/4/18 11:35 AM.  Always use your most recent med list.  
  
  
  
  
 COUMADIN 5 mg tablet Generic drug:  warfarin Take 7.5 mg by mouth daily. furosemide 40 mg tablet Commonly known as:  LASIX Take  by mouth daily. glimepiride 2 mg tablet Commonly known as:  AMARYL Take  by mouth every morning. lisinopril 5 mg tablet Commonly known as:  Harjinder Peppers Take  by mouth daily. Indications: HYPERTENSION  
  
 metFORMIN 500 mg tablet Commonly known as:  GLUCOPHAGE Take  by mouth daily (with breakfast). metoprolol succinate 25 mg XL tablet Commonly known as:  TOPROL-XL TK 1 T PO D  
  
 ondansetron 8 mg disintegrating tablet Commonly known as:  ZOFRAN ODT Take 1 Tab by mouth every eight (8) hours as needed for Nausea. oxyCODONE IR 15 mg immediate release tablet Commonly known as:  OXY-IR Take 1 Tab by mouth every four (4) hours as needed for Pain. Max Daily Amount: 90 mg. Indications: Pain  
  
 pioglitazone 30 mg tablet Commonly known as:  ACTOS TK 1 T PO  D  
  
 * potassium chloride SR 10 mEq tablet Commonly known as:  KLOR-CON 10 Take  by mouth Every Mon, Wed & Sun.  
  
 * potassium chloride SA 10 mEq capsule Commonly known as:  MICRO-K  
TK 1 C PO D  
  
 sofosbuvir-velpatas-voxilaprev 400-100-100 mg Tab Commonly known as:  Marielena  Take 1 Tab by mouth daily for 84 days. Indications: CHRONIC HEPATITIS C - GENOTYPE 2  
  
 spironolactone 25 mg tablet Commonly known as:  ALDACTONE Take  by mouth daily. tamsulosin 0.4 mg capsule Commonly known as:  FLOMAX Take 0.4 mg by mouth daily. * Notice: This list has 2 medication(s) that are the same as other medications prescribed for you. Read the directions carefully, and ask your doctor or other care provider to review them with you. Prescriptions Printed Refills  
 oxyCODONE IR (OXY-IR) 15 mg immediate release tablet 0 Sig: Take 1 Tab by mouth every four (4) hours as needed for Pain. Max Daily Amount: 90 mg. Indications: Pain Class: Print  Route: Oral  
  
 Prescriptions Sent to Pharmacy Refills  
 ondansetron (ZOFRAN ODT) 8 mg disintegrating tablet 3 Sig: Take 1 Tab by mouth every eight (8) hours as needed for Nausea. Class: Normal  
 Pharmacy: [facility]etta Webb @ [address]  #: [phone] Route: Oral  
  
Follow-up Instructions Return in about 4 weeks (around 11/1/2018). Introducing [facility] SERVICES! Dear Luis Bravo: 
Thank you for requesting a [employer] account. Our records indicate that you already have an active [employer] account. You can access your account anytime at [email or web address]. [email or web address] Did you know that you can access your hospital and ER discharge instructions at any time in [employer]? You can also review all of your test results from your hospital stay or ER visit. Additional Information If you have questions, please visit the Frequently Asked Questions section of the [employer] website at [email or web address]. [email or web address] [email or web address] Remember, [employer] is NOT to be used for urgent needs. For medical emergencies, dial 911. Now available from your iPhone and Android! Please provide this summary of care documentation to your next provider. Your primary care clinician is listed as Janelle Houser. If you have any questions after today's visit, please call [phone].

## 2018-10-04 NOTE — PROGRESS NOTES
[address] MD, [address], [place], [place]       HANY Brewster PA-C Valeda Parr, ACNP-BC   HANY Conde NP        at [address], [address] [address]     [phone]     FAX: [phone]    at [address], [address]     [phone]     FAX: [phone]       Patient Care Team:  Shreyas Shelton MD as PCP - General (Unknown Physician Specialty)  Martita Antony MD (Radiology)      Problem List  Date Reviewed: 10/4/2018          Codes Class Noted    Severe obesity (BMI 35.0-39. 9) ICD-10-CM: E66.01  ICD-9-CM: 278.01  7/23/2018        Hepatocellular carcinoma ([facility] ICD-10-CM: C22.0  ICD-9-CM: 155.0  9/6/2017        A-fib ([facility] [address] [facility] ICD-10-CM: I48.91  ICD-9-CM: 427.31  11/30/2015        Cirrhosis ([facility] ICD-10-CM: K74.60  ICD-9-CM: 571.5  6/30/2015        Elevated prostate specific antigen (PSA) ICD-10-CM: R97.20  ICD-9-CM: 790.93  5/10/2014        H/O resection of liver ICD-10-CM: Z90.49  ICD-9-CM: V15.29  12/24/2012    Overview Signed 12/24/2012  1:22 AM by Nikki Stein MD     Partial right lobectomy. Reason for this is not known. Chronic hepatitis C ([facility] [address] [facility] ICD-10-CM: B18.2  ICD-9-CM: 070.54  12/5/2012    Overview Addendum 6/30/2016  9:26 AM by Nikki Stein MD     Peginterferon/ribavirin ~0968. Treated 6 months.   SOF/RBV relapse             Hypertension ICD-10-CM: I10  ICD-9-CM: 401.9  12/5/2012        PAT (paroxysmal atrial tachycardia) ([facility] ICD-10-CM: I47.1  ICD-9-CM: 427.0  12/5/2012    Overview Signed 12/5/2012  2:12 PM by Nikki Stein MD     Ablation 2002             Colon cancer [facility]) ICD-10-CM: C18.9  ICD-9-CM: 153.9  12/5/2012    Overview Signed 12/5/2012  2:13 PM by Nikki Stein MD     Partial colectomy 2004             S/P knee surgery ICD-10-CM: X15.350  ICD-9-CM: V45.89  12/5/2012    Overview Signed 12/5/2012  2:14 PM by Devin Menezes MD     2010                   Corrinne Papa. returns to the [address] for management of cirrhosis secondary to chronic HCV. Mr. Evens Sandoval has a  liver cancer which is being treated by Dr. Elie Spence. He began HCV treatment for the fourth time on 08/30/2018 with once daily Vosevi. He will be treated for 12 weeks this time. The active problem list, all pertinent past medical history, medications, liver histology, endoscopic studies, radiologic findings and laboratory findings related to the liver disorder were reviewed with the patient. The patient is a 74 y.o.  male who first found out he had HCV in the early 2000s when he was living in [place]. He was most likely exposed to [address] in the 1970s when he had an episode of acute icteric hepatitis. While living in [place] he was treated with peginterferon and ribavirin in the 2000s. He was treated with SOF and RBV for 12 weeks in 2015. He failed to achieve SVR with these treatments. He was treated with Epclusa and bid Ribavirin between 7/24/2016 -10/15/2016. He completed the prescribed 12 weeks of therapy. He had a virologic response but never became HCV RNA undetectable. The patient underwent a right lobe segmental liver resection while living in [place]. The pathology is not known but the patient states he was told this was benign. Imaging of the liver was performed in 08/2017 with CT. Likely infiltrative lesion in segment 6 and 7 of the right hepatic lobe. This has been treated with Y-90 in 09/2017. MRI in 11/2017 demonstrated a good early response. Imaging has been repeated in 04/2018 with CT and in 06/2018 with MRI. No changes, stable post Y-90 changes. No new lesions.       A Fibroscan demonstrated cirrhosis with a value of 18.8 kPa    The patient notes fatigue, severe pain in the right side over the liver, and swelling of the lower extremities. He reports that the pain is getting worse. The patient has not experienced problems concentrating, swelling of the abdomen, hematemesis, hematochezia. The patient has Moderate limitations in functional activities which can be attributed to the liver disease and to other medical problems that are not related to the liver disease. ALLERGIES:  No Known Allergies    MEDICATIONS  Current Outpatient Prescriptions   Medication Sig    oxyCODONE IR (OXY-IR) 15 mg immediate release tablet Take 1 Tab by mouth every four (4) hours as needed for Pain. Max Daily Amount: 90 mg. Indications: Pain    sofosbuvir-velpatas-voxilaprev (VOSEVI) 400-100-100 mg tab Take 1 Tab by mouth daily for 84 days. Indications: CHRONIC HEPATITIS C - GENOTYPE 2    metoprolol succinate (TOPROL-XL) 25 mg XL tablet TK 1 T PO D    pioglitazone (ACTOS) 30 mg tablet TK 1 T PO  D    potassium chloride SA (MICRO-K) 10 mEq capsule TK 1 C PO D    potassium chloride SR (KLOR-CON 10) 10 mEq tablet Take  by mouth Every Mon, Wed & Sun.    furosemide (LASIX) 40 mg tablet Take  by mouth daily.  tamsulosin (FLOMAX) 0.4 mg capsule Take 0.4 mg by mouth daily.  warfarin (COUMADIN) 5 mg tablet Take 7.5 mg by mouth daily.  lisinopril (PRINIVIL, ZESTRIL) 5 mg tablet Take  by mouth daily. Indications: HYPERTENSION    glimepiride (AMARYL) 2 mg tablet Take  by mouth every morning.  metFORMIN (GLUCOPHAGE) 500 mg tablet Take  by mouth daily (with breakfast).  spironolactone (ALDACTONE) 25 mg tablet Take  by mouth daily. No current facility-administered medications for this visit. SYSTEM REVIEW NOT RELATED TO LIVER DISEASE OR REVIEWED ABOVE:  Constitution systems: Negative for fever, chills, weight gain, weight loss. Eyes: Negative for visual changes. ENT: Negative for sore throat, painful swallowing. Respiratory: Negative for cough, hemoptysis, SOB.    Cardiology: Negative for chest pain, palpitations. GI:  Negative for constipation or diarrhea. Positive for low right abdominal pain. : Negative for urinary frequency, dysuria, hematuria, nocturia. Skin: Negative for rash. Hematology: Negative for easy bruising, blood clots. Musculo-skeletal: Negative for back pain, muscle pain, weakness. Neurologic: Negative for headaches, dizziness, vertigo, memory problems not related to HE. Psychology: Negative for anxiety, depression. FAMILY HISTORY  There is no family members with HCV or liver disease    SOCIAL HISTORY:  The patient is . The patient has 3 children, 7 grandchildren. The patient has never used tobacco products. The patient has been abstinent from alcohol since 1970s. The patient used to work as . The patient retired in 2005. PHYSICAL EXAMINATION:  Visit Vitals    /77 (BP 1 Location: Right arm, BP Patient Position: Sitting)    Pulse (!) 55    Temp 96.7 °F (35.9 °C) (Tympanic)    Resp 18    Ht 5' 10\" (1.778 m)    Wt 239 lb (108.4 kg)    SpO2 96%    BMI 34.29 kg/m2     General: No acute distress. Eyes: Sclera anicteric. ENT: No oral lesions. Thyroid normal.  Nodes: No adenopathy. Skin: No spider angiomata. No jaundice. No palmar erythema. Respiratory: Lungs clear to auscultation. Cardiovascular: Regular heart rate. No murmurs. No JVD. Abdomen: Soft non-tender. Liver size normal to percussion/palpation. Spleen not palpable. No obvious ascites. Extremities: No lower edema. No muscle wasting. No gross arthritic changes. Neurologic: Alert and oriented. Cranial nerves grossly intact. No asterixsis.     LABORATORY STUDIES:   [facility] [address] [place] [address] Units 6/18/2018 5/9/2018 9/25/2017   WBC 3.4 - 10.8 x10E3/uL 5.1 4.6 5.5   ANC 1.4 - 7.0 x10E3/uL 3.7 3.3    HGB 13.0 - 17.7 g/dL 13.7 14.5 14.0    - 379 x10E3/uL 166 169 160   INR 0.8 - 1.2 1.5 (H) 2.3 (H) 1.0   AST 0 - 40 IU/L 46 (H) 48 (H) 59 (H)   ALT 0 - 44 IU/L 47 (H) 41 77 (H)   Alk Phos 39 - 117 IU/L 57 53 79   Bili, Total 0.0 - 1.2 mg/dL 0.8 0.7 0.5   Bili, Direct 0.00 - 0.40 mg/dL 0.20 0.21 0.1   Albumin 3.5 - 4.8 g/dL 4.0 4.1 3.3 (L)   BUN 8 - 27 mg/dL 15 14 22 (H)   Creat 0.76 - 1.27 mg/dL 0.69 (L) 0.85 0.84   Na 134 - 144 mmol/L 142 140 139   K 3.5 - 5.2 mmol/L 4.0 4.2 4.3   Cl 96 - 106 mmol/L 103 102 107   CO2 20 - 29 mmol/L 23 24 27   Glucose 65 - 99 mg/dL 161 (H) 113 (H) 103 (H)     CANCER SCREENING:  Cancer Screening Latest Ref Rng & Units 6/18/2018 5/9/2018   AFP, Serum 0.0 - 8.0 ng/mL 5.0 5.4   AFP-L3% 0.0 - 9.9 % 9.2 9.9     Cancer Screening Latest Ref Rng & Units 9/6/2017 6/28/2017 5/2/2017   AFP, Serum 0.0 - 8.0 ng/mL 27.5 (H)  46.7 (H) 79.8 (H)   AFP-L3% 0.0 - 9.9 % 29.7 (H)  14.5 (H) 12.4 (H)     SEROLOGIES:  8/2012. HBA1C 6.7    Serologies Latest Ref Rng 5/19/2015 12/5/2012   Hep A Ab, Total Negative  Positive (A)   Hep B Surface Ag Negative  Negative   Hep B Core Ab, Total Negative  Positive (A)   Hep B Surface Ab 0.00 - 0.99 Index Value  0.15   Hep C Genotype  2 CANCELED   HCV RT-PCR, Quant  [id number]    IL28B Genotype   TT genotype     5/2015. HCV RNA Positive  10/2015. HCV RNA undetectable  12/2015. HCV RNA log 6.7 intU  5/2016. HCV RNA Log 6.8 intU  6/2016. HCV RNA positive  8/2016. Undetectable  11/2016.  690 intU  5/2017. HCV RNA Log 7 intU, HCV genotype 2    LIVER HISTOLOGY:  6/2015. FibroScan performed at [address]. EkPa was 18.8. Suggested fibrosis level is F4.  08/2017. Liver biopsy by Dr. Asher Mcpherson. Suspicious for hepatocellular carcinoma, but there is simply insufficient quantity of material for a firm diagnosis. ENDOSCOPIC PROCEDURES:  8/2015. EGD by Dr. Toribio Ely. Small esophageal varices. No banding performed. Repeat in 2 years. RADIOLOGY:  12/2012. Ultrasound of liver. Echogenic consistent with cirrhosis.   4.5x3.6 cm right lobe complex mass lesion. No dilated bile ducts. No ascites. 2012. CT scan abdomen with and without IV contrast.  Evidence of prior right lobe resection. The liver otherwise appears normal.  No liver mass lesions. No dilated bile ducts. No bile duct strictures. No ascites. 2015. Ultrasound of liver. Echogenic consistent with chronic liver disease. No liver mass lesions. No dilated bile ducts. No ascites. Post surgical changes. 2016. Ultrasound of liver. Echogenic consistent with cirrhosis. No liver mass lesions. No dilated bile ducts. No ascites. 2017. Dynamic MRI of the abdomen. Consistent with cirrhosis. 2.8 cm enhancing subcapsular mass right hepatic lobe. Also 15 mm solid mass. 2017. Abdominal CT w/wo contrast.  Likely infiltrative lesion in segment 6 and 7 of the right hepatic lobe. 2017. MRI. \"Good early response\". 2018. Liver/Pelvic Ct w/wo IV contrast.  Large infiltrative hepatocellular carcinoma with findings suggesting interval treatment.  Presence of residual tumor is equivocal.    OTHER TESTIN2017. Liver biopsy. The EMR in this case has been reviewed. The clinical story is quite compelling for a consideration of hepatocellular carcinoma. The biopsy sample does not contain enough of the atypical material for a firm diagnosis. ASSESSMENT AND PLAN:  Chronic HCV with cirrhosis, HCC and chronic hepatitis C. The most recent laboratory studies indicate that the liver transaminases are elevated, alkaline phosphatase is normal, tests of hepatic synthetic and metabolic function are normal, and the platelet count is normal.  INR is prolonged. Will perform laboratory testing to monitor liver function and degree of liver injury. This will include hepatic panel, a CBC w/ diff, a BMP, an AFP-L3%, a PT/INR, and an HCV RNA. Complications of cirrhosis were discussed in detail.  We discussed thrombocytopenia, portal hypertension, varices, GI bleeding, peripheral edema, ascites, hepatic encephalopathy, and hepatocellular carcinoma. We discussed the need for follow ups on a regular basis, at 3 month intervals to monitor for complications. We discussed the need for every 6 month liver imaging studies. The patient underwent Y90 therasphere radioembolization of right hepatic lobe tumor on 09/2017 by Dr. Barber Bender. The patient returned to Dr. Lakisha Darling one month after the procedure to have imaging completed, but \"freaked out\" during MRI and the imaging could not be completed. It appears that the patient did eventually have MRI completed in 11/2017 and the demonstrated a good early response to the Y-90. Imaging has been repeated in 04/2018 with CT and in 06/2018 with MRI. No changes, stable post Y-90 changes. No new lesions. The patient spends a lot of time in [place] and has established care there with [facility]. We have no reports from them. He was seen by Dr. Barber Bender in 06/2018 and will follow up with him in [place]. The patient continues to complain of severe right upper quadrant pain. He states that this is getting worse. 15 mg oxycodone IR provided. He already has the Naloxone 4 mg nasal spray. The patient was previously treated for HCV with several different therapies including PEGINF and RBV, SOF and RBV, and most recently Epclusa and RBV. He has not achieved SVR. The patient has HCV genotype 2. He began HCV therapy for the fourth time on 08/30/2018 with once daily Vosevi. Jered Keep is a fixed dose combination of sofosbuvir (a polymerase inhibitor), velpatasvir (an NS5A inhibitor), and voxilaprevir (an NS3/4A protease inhibitor). The regime if for one tablet a day for 12 weeks. The patient has no treatment related complaints. He denies missing doses of the medication. Educational material was provided. The patient was directed to continue all current medications at the current dosages.   There are no contraindications for the patient to take any medications that are necessary for treatment of other medical issues. The patient was instructed not to start any PPI while on treatment. The patient was instructed not to take any antacids within 4 hours of taking the Vosevi. The patient verbalized understanding of these instructions. The patient was counseled regarding alcohol consumption. Vaccination for viral hepatitis A and B is not needed. The patient has serologic evidence of prior exposure or vaccination with immunity. All of the above issues were discussed with the patient. All questions were answered. The patient expressed a clear understanding of the above. 30 minutes total time spent with this patient with more than 50% of this time spent counseling and coordinating care as described above. [address] [address] in 4 weeks.       Amanda Amaya NP   [facility] [place] of [address] [place] [address], [address] [place] [address]   [phone]

## 2018-10-11 LAB
AFP L3 MFR SERPL: 7.2 % (ref 0–9.9)
AFP SERPL-MCNC: 4.7 NG/ML (ref 0–8)
ALBUMIN SERPL-MCNC: 4.3 G/DL (ref 3.5–4.8)
ALP SERPL-CCNC: 115 IU/L (ref 39–117)
ALT SERPL-CCNC: 37 IU/L (ref 0–44)
AST SERPL-CCNC: 29 IU/L (ref 0–40)
BASOPHILS # BLD AUTO: 0 X10E3/UL (ref 0–0.2)
BASOPHILS NFR BLD AUTO: 1 %
BILIRUB DIRECT SERPL-MCNC: 0.24 MG/DL (ref 0–0.4)
BILIRUB SERPL-MCNC: 0.7 MG/DL (ref 0–1.2)
BUN SERPL-MCNC: 18 MG/DL (ref 8–27)
BUN/CREAT SERPL: 20 (ref 10–24)
CALCIUM SERPL-MCNC: 9.7 MG/DL (ref 8.6–10.2)
CHLORIDE SERPL-SCNC: 97 MMOL/L (ref 96–106)
CO2 SERPL-SCNC: 22 MMOL/L (ref 20–29)
CREAT SERPL-MCNC: 0.91 MG/DL (ref 0.76–1.27)
EOSINOPHIL # BLD AUTO: 0.1 X10E3/UL (ref 0–0.4)
EOSINOPHIL NFR BLD AUTO: 2 %
ERYTHROCYTE [DISTWIDTH] IN BLOOD BY AUTOMATED COUNT: 13.7 % (ref 12.3–15.4)
GLUCOSE SERPL-MCNC: 366 MG/DL (ref 65–99)
HCT VFR BLD AUTO: 46.8 % (ref 37.5–51)
HCV RNA SERPL NAA+PROBE-ACNC: NORMAL IU/ML
HGB BLD-MCNC: 15.4 G/DL (ref 13–17.7)
IMM GRANULOCYTES # BLD: 0.1 X10E3/UL (ref 0–0.1)
IMM GRANULOCYTES NFR BLD: 2 %
INR PPP: 1.2 (ref 0.8–1.2)
LYMPHOCYTES # BLD AUTO: 0.6 X10E3/UL (ref 0.7–3.1)
LYMPHOCYTES NFR BLD AUTO: 13 %
MCH RBC QN AUTO: 33.5 PG (ref 26.6–33)
MCHC RBC AUTO-ENTMCNC: 32.9 G/DL (ref 31.5–35.7)
MCV RBC AUTO: 102 FL (ref 79–97)
MONOCYTES # BLD AUTO: 0.5 X10E3/UL (ref 0.1–0.9)
MONOCYTES NFR BLD AUTO: 12 %
NEUTROPHILS # BLD AUTO: 3 X10E3/UL (ref 1.4–7)
NEUTROPHILS NFR BLD AUTO: 70 %
PLATELET # BLD AUTO: 194 X10E3/UL (ref 150–379)
POTASSIUM SERPL-SCNC: 4.4 MMOL/L (ref 3.5–5.2)
PROT SERPL-MCNC: 7.7 G/DL (ref 6–8.5)
PROTHROMBIN TIME: 12.5 SEC (ref 9.1–12)
RBC # BLD AUTO: 4.6 X10E6/UL (ref 4.14–5.8)
SODIUM SERPL-SCNC: 134 MMOL/L (ref 134–144)
TEST INFORMATION: NORMAL
WBC # BLD AUTO: 4.1 X10E3/UL (ref 3.4–10.8)

## 2018-10-26 RX ORDER — ONDANSETRON 8 MG/1
8 TABLET, ORALLY DISINTEGRATING ORAL
Qty: 60 TAB | Refills: 3 | Status: SHIPPED | OUTPATIENT
Start: 2018-10-26 | End: 2018-11-28

## 2018-11-05 ENCOUNTER — TELEPHONE (OUTPATIENT)
Dept: HEMATOLOGY | Age: 73
End: 2018-11-05

## 2018-11-05 DIAGNOSIS — C22.0 HEPATOCELLULAR CARCINOMA (HCC): ICD-10-CM

## 2018-11-05 RX ORDER — OXYCODONE HYDROCHLORIDE 15 MG/1
15 TABLET ORAL
Qty: 90 TAB | Refills: 0 | Status: SHIPPED | OUTPATIENT
Start: 2018-11-05 | End: 2018-12-05 | Stop reason: SDUPTHER

## 2018-11-05 NOTE — TELEPHONE ENCOUNTER
Patient is requesting a refill on Hydrocodone. He sated that he will be up here later to  prescription.

## 2018-12-05 DIAGNOSIS — C22.0 HEPATOCELLULAR CARCINOMA (HCC): ICD-10-CM

## 2018-12-05 RX ORDER — OXYCODONE HYDROCHLORIDE 15 MG/1
15 TABLET ORAL
Qty: 90 TAB | Refills: 0 | Status: SHIPPED | OUTPATIENT
Start: 2018-12-05 | End: 2019-01-03 | Stop reason: SDUPTHER

## 2019-01-02 DIAGNOSIS — C22.0 HEPATOCELLULAR CARCINOMA (HCC): ICD-10-CM

## 2019-01-02 RX ORDER — OXYCODONE HYDROCHLORIDE 15 MG/1
15 TABLET ORAL
Qty: 90 TAB | Refills: 0 | Status: CANCELLED | OUTPATIENT
Start: 2019-01-02

## 2019-01-03 DIAGNOSIS — C22.0 HEPATOCELLULAR CARCINOMA (HCC): ICD-10-CM

## 2019-01-03 RX ORDER — OXYCODONE HYDROCHLORIDE 15 MG/1
15 TABLET ORAL
Qty: 90 TAB | Refills: 0 | Status: SHIPPED | OUTPATIENT
Start: 2019-01-03 | End: 2019-02-05 | Stop reason: SDUPTHER

## 2019-01-23 ENCOUNTER — TELEPHONE (OUTPATIENT)
Dept: HEMATOLOGY | Age: 74
End: 2019-01-23

## 2019-02-05 DIAGNOSIS — C22.0 HEPATOCELLULAR CARCINOMA (HCC): ICD-10-CM

## 2019-02-05 RX ORDER — OXYCODONE HYDROCHLORIDE 15 MG/1
15 TABLET ORAL
Qty: 90 TAB | Refills: 0 | Status: SHIPPED | OUTPATIENT
Start: 2019-02-05 | End: 2019-02-21 | Stop reason: SDUPTHER

## 2019-02-21 ENCOUNTER — HOSPITAL ENCOUNTER (OUTPATIENT)
Dept: LAB | Age: 74
Discharge: HOME | End: 2019-02-21

## 2019-02-21 ENCOUNTER — OFFICE VISIT (OUTPATIENT)
Dept: HEMATOLOGY | Age: 74
End: 2019-02-21

## 2019-02-21 VITALS
RESPIRATION RATE: 18 BRPM | WEIGHT: 226.4 LBS | DIASTOLIC BLOOD PRESSURE: 83 MMHG | SYSTOLIC BLOOD PRESSURE: 155 MMHG | HEART RATE: 89 BPM | OXYGEN SATURATION: 97 % | HEIGHT: 70 IN | TEMPERATURE: 97.1 F | BODY MASS INDEX: 32.41 KG/M2

## 2019-02-21 DIAGNOSIS — C22.0 HEPATOCELLULAR CARCINOMA (HCC): ICD-10-CM

## 2019-02-21 LAB
AMMONIA PLAS-SCNC: 49 UMOL/L (ref 11–32)
XX-LABCORP SPECIMEN COL,LCBCF: NORMAL

## 2019-02-21 PROCEDURE — 82140 ASSAY OF AMMONIA: CPT

## 2019-02-21 PROCEDURE — 99001 SPECIMEN HANDLING PT-LAB: CPT

## 2019-02-21 RX ORDER — LACTULOSE 10 G/15ML
20 SOLUTION ORAL; RECTAL 2 TIMES DAILY
COMMUNITY
Start: 2019-02-14

## 2019-02-21 RX ORDER — OXYCODONE HYDROCHLORIDE 15 MG/1
15 TABLET ORAL
Qty: 120 TAB | Refills: 0 | Status: SHIPPED | OUTPATIENT
Start: 2019-02-21 | End: 2019-06-29 | Stop reason: SDUPTHER

## 2019-02-21 NOTE — PROGRESS NOTES
[address] [place], [address], [place], [place]       HANY Hitchcock PA-C Evander Hays, [facility]   HANY Epperson NP        at [address], [address] [address]     [phone]     FAX: [phone]    at [address], [address]     [phone]     FAX: [phone]       Patient Care Team:  Jose Rowland MD as PCP - General (Unknown Physician Specialty)  Effie Dexter MD (Radiology)      Problem List  Date Reviewed: 2/21/2019          Codes Class Noted    Severe obesity (BMI 35.0-39. 9) ICD-10-CM: E66.01  ICD-9-CM: 278.01  7/23/2018        Hepatocellular carcinoma ([facility] ICD-10-CM: C22.0  ICD-9-CM: 155.0  9/6/2017        A-fib ([facility] ICD-10-CM: I48.91  ICD-9-CM: 427.31  11/30/2015        Cirrhosis ([facility] ICD-10-CM: K74.60  ICD-9-CM: 571.5  6/30/2015        Elevated prostate specific antigen (PSA) ICD-10-CM: R97.20  ICD-9-CM: 790.93  5/10/2014        H/O resection of liver ICD-10-CM: Z90.49  ICD-9-CM: V15.29  12/24/2012    Overview Signed 12/24/2012  1:22 AM by Luciano Bird MD     Partial right lobectomy. Reason for this is not known. Chronic hepatitis C ([facility] ICD-10-CM: B18.2  ICD-9-CM: 070.54  12/5/2012    Overview Addendum 6/30/2016  9:26 AM by Luciano Bird MD     Peginterferon/ribavirin ~0037. Treated 6 months.   SOF/RBV relapse             Hypertension ICD-10-CM: I10  ICD-9-CM: 401.9  12/5/2012        PAT (paroxysmal atrial tachycardia) ([facility] ICD-10-CM: I47.1  ICD-9-CM: 427.0  12/5/2012    Overview Signed 12/5/2012  2:12 PM by Luciano Bird MD     Ablation 2002             Colon cancer [facility]) ICD-10-CM: C18.9  ICD-9-CM: 153.9  12/5/2012    Overview Signed 12/5/2012  2:13 PM by Luciano Bird MD     Partial colectomy 2004             S/P knee surgery ICD-10-CM: Z98.890  ICD-9-CM: V45.89  12/5/2012    Overview Signed 12/5/2012  2:14 PM by Medardo James MD     2010                   Jensen Lozada. returns to the [facility] for management of cirrhosis secondary to chronic HCV. Mr. Anika Palomares has a  liver cancer which is being treated by Dr. Arik Ridley. He began HCV treatment for the fourth time on 08/30/2018 with once daily Vosevi. He completed the 12 weeks regime on 11/21/2018. He was SVR 12 on 02/21/2019. HCV is finally cured. The active problem list, all pertinent past medical history, medications, liver histology, endoscopic studies, radiologic findings and laboratory findings related to the liver disorder were reviewed with the patient. The patient was admitted to [facility] from 02/11/2019 through 02/14/2019 for hepatic encephalopathy. He is now taking lactulose and is lucid. The patient is a 74 y.o.  male who first found out he had HCV in the early 2000s when he was living in [place]. He was most likely exposed to [address] [facility] [address] [facility] [address] in the 1970s when he had an episode of acute icteric hepatitis. While living in [place] he was treated with peginterferon and ribavirin in the 2000s. He was treated with SOF and RBV for 12 weeks in 2015. He failed to achieve SVR with these treatments. He was treated with Epclusa and bid Ribavirin between 7/24/2016 -10/15/2016. He completed the prescribed 12 weeks of therapy. He had a virologic response but never became HCV RNA undetectable. The patient underwent a right lobe segmental liver resection while living in [place]. The pathology is not known but the patient states he was told this was benign. Imaging of the liver was performed in 08/2017 with CT. Likely infiltrative lesion in segment 6 and 7 of the right hepatic lobe. This has been treated with Y-90 in 09/2017. MRI in 11/2017 demonstrated a good early response.       Imaging has been repeated in 04/2018 with CT and in 06/2018 with MRI. No changes, stable post Y-90 changes. No new lesions. Most recent imaging was completed at [facility] with abdominal CT. Liver demonstrates postsurgical changes of partial resection of the right lobe of the liver are noted. No suspicious lesion appreciated within the liver. A Fibroscan demonstrated cirrhosis with a value of 18.8 kPa    The patient notes fatigue, severe pain in the right side over the liver, and swelling of the lower extremities. He reports that the pain is getting worse. The patient has not experienced problems concentrating, swelling of the abdomen, hematemesis, hematochezia. The patient has Moderate limitations in functional activities which can be attributed to the liver disease and to other medical problems that are not related to the liver disease. ALLERGIES:  No Known Allergies    MEDICATIONS  Current Outpatient Medications   Medication Sig    lactulose (CHRONULAC) 10 gram/15 mL solution Take 20 mL by mouth two (2) times a day.  oxyCODONE IR (OXY-IR) 15 mg immediate release tablet Take 1 Tab by mouth every four (4) hours as needed for Pain. Max Daily Amount: 90 mg.    nystatin-triamcinolone (MYCOLOG) 100,000-0.1 unit/gram-% ointment Apply  to affected area two (2) times a day.  metoprolol succinate (TOPROL-XL) 25 mg XL tablet TK 1 T PO D    pioglitazone (ACTOS) 30 mg tablet TK 1 T PO  D    potassium chloride SA (MICRO-K) 10 mEq capsule TK 1 C PO D    potassium chloride SR (KLOR-CON 10) 10 mEq tablet Take  by mouth Every Mon, Wed & Sun.    furosemide (LASIX) 40 mg tablet Take  by mouth daily.  tamsulosin (FLOMAX) 0.4 mg capsule Take 0.4 mg by mouth daily.  warfarin (COUMADIN) 5 mg tablet Take 7.5 mg by mouth daily.  lisinopril (PRINIVIL, ZESTRIL) 5 mg tablet Take  by mouth daily. Indications: HYPERTENSION    glimepiride (AMARYL) 2 mg tablet Take  by mouth every morning.     metFORMIN (GLUCOPHAGE) 500 mg tablet Take by mouth daily (with breakfast).  spironolactone (ALDACTONE) 25 mg tablet Take  by mouth daily. No current facility-administered medications for this visit. SYSTEM REVIEW NOT RELATED TO LIVER DISEASE OR REVIEWED ABOVE:  Constitution systems: Negative for fever, chills, weight gain, weight loss. Eyes: Negative for visual changes. ENT: Negative for sore throat, painful swallowing. Respiratory: Negative for cough, hemoptysis, SOB. Cardiology: Negative for chest pain, palpitations. GI:  Negative for constipation or diarrhea. Positive for low right abdominal pain. : Negative for urinary frequency, dysuria, hematuria, nocturia. Skin: Negative for rash. Hematology: Negative for easy bruising, blood clots. Musculo-skeletal: Negative for back pain, muscle pain, weakness. Neurologic: Negative for headaches, dizziness, vertigo, memory problems not related to HE. Psychology: Negative for anxiety, depression. FAMILY HISTORY  There is no family members with HCV or liver disease    SOCIAL HISTORY:  The patient is . The patient has 3 children, 7 grandchildren. The patient has never used tobacco products. The patient has been abstinent from alcohol since 1970s. The patient used to work as . The patient retired in 2005. PHYSICAL EXAMINATION:  Visit Vitals  /83 (BP 1 Location: Right arm, BP Patient Position: Sitting)   Pulse 89   Temp 97.1 °F (36.2 °C)   Resp 18   Ht 5' 10\" (1.778 m)   Wt 226 lb 6.4 oz (102.7 kg)   SpO2 97%   BMI 32.49 kg/m²     General: No acute distress. Eyes: Sclera anicteric. ENT: No oral lesions. Thyroid normal.  Nodes: No adenopathy. Skin: No spider angiomata. No jaundice. No palmar erythema. Respiratory: Lungs clear to auscultation. Cardiovascular: Regular heart rate. No murmurs. No JVD. Abdomen: Soft non-tender. Liver size normal to percussion/palpation. Spleen not palpable. No obvious ascites.   Extremities: No lower edema. No muscle wasting. No gross arthritic changes. Neurologic: Alert and oriented. Cranial nerves grossly intact. No asterixsis.     LABORATORY STUDIES:   [place] of [address] Units 2/21/2019   WBC 3.4 - 10.8 x10E3/uL 5.5   ANC 1.4 - 7.0 x10E3/uL 4.3   HGB 13.0 - 17.7 g/dL 14.0    - 379 x10E3/uL 221   INR 0.8 - 1.2 1.2   AST 0 - 40 IU/L 31   ALT 0 - 44 IU/L 30   Alk Phos 39 - 117 IU/L 73   Bili, Total 0.0 - 1.2 mg/dL 0.6   Bili, Direct 0.00 - 0.40 mg/dL 0.18   Albumin 3.5 - 4.8 g/dL 3.7   BUN 8 - 27 mg/dL 12   Creat 0.76 - 1.27 mg/dL 0.66 (L)   Na 134 - 144 mmol/L 140   K 3.5 - 5.2 mmol/L 4.2   Cl 96 - 106 mmol/L 105   CO2 20 - 29 mmol/L 19 (L)   Glucose 65 - 99 mg/dL 200 (H)   Ammonia ug/dL CANCELED     [place] of [facility] Ref Rng & Units 10/4/2018   WBC 3.4 - 10.8 x10E3/uL 4.1   ANC 1.4 - 7.0 x10E3/uL 3.0   HGB 13.0 - 17.7 g/dL 15.4    - 379 x10E3/uL 194   INR 0.8 - 1.2 1.2   AST 0 - 40 IU/L 29   ALT 0 - 44 IU/L 37   Alk Phos 39 - 117 IU/L 115   Bili, Total 0.0 - 1.2 mg/dL 0.7   Bili, Direct 0.00 - 0.40 mg/dL 0.24   Albumin 3.5 - 4.8 g/dL 4.3   BUN 8 - 27 mg/dL 18   Creat 0.76 - 1.27 mg/dL 0.91   Na 134 - 144 mmol/L 134   K 3.5 - 5.2 mmol/L 4.4   Cl 96 - 106 mmol/L 97   CO2 20 - 29 mmol/L 22   Glucose 65 - 99 mg/dL 366 (H)   Ammonia ug/dL      [place] of [address] [place] [address] Ref Rng & Units 6/18/2018 5/9/2018 9/25/2017   WBC 3.4 - 10.8 x10E3/uL 5.1 4.6 5.5   ANC 1.4 - 7.0 x10E3/uL 3.7 3.3    HGB 13.0 - 17.7 g/dL 13.7 14.5 14.0    - 379 x10E3/uL 166 169 160   INR 0.8 - 1.2 1.5 (H) 2.3 (H) 1.0   AST 0 - 40 IU/L 46 (H) 48 (H) 59 (H)   ALT 0 - 44 IU/L 47 (H) 41 77 (H)   Alk Phos 39 - 117 IU/L 57 53 79   Bili, Total 0.0 - 1.2 mg/dL 0.8 0.7 0.5   Bili, Direct 0.00 - 0.40 mg/dL 0.20 0.21 0.1   Albumin 3.5 - 4.8 g/dL 4.0 4.1 3.3 (L)   BUN 8 - 27 mg/dL 15 14 22 (H)   Creat 0.76 - 1.27 mg/dL 0.69 (L) 0.85 0.84   Na 134 - 144 mmol/L 142 140 139   K 3.5 - 5.2 mmol/L 4.0 4.2 4.3   Cl 96 - 106 mmol/L 103 102 107   CO2 20 - 29 mmol/L 23 24 27   Glucose 65 - 99 mg/dL 161 (H) 113 (H) 103 (H)     CANCER SCREENING:  Cancer Screening Latest Ref Rng & Units 2/21/2019  10/4/2018 6/18/2018   AFP, Serum 0.0 - 8.0 ng/mL 3.5  4.7 5.0   AFP-L3% 0.0 - 9.9 % Comment  7.2 9.2     SEROLOGIES:  8/2012. HBA1C 6.7    Serologies Latest Ref Rng 5/19/2015 12/5/2012   Hep A Ab, Total Negative  Positive (A)   Hep B Surface Ag Negative  Negative   Hep B Core Ab, Total Negative  Positive (A)   Hep B Surface Ab 0.00 - 0.99 Index Value  0.15   Hep C Genotype  2 CANCELED   HCV RT-PCR, Quant  [id number]    IL28B Genotype   TT genotype     5/2015. HCV RNA Positive  10/2015. HCV RNA undetectable  12/2015. HCV RNA log 6.7 intU  5/2016. HCV RNA Log 6.8 intU  6/2016. HCV RNA positive  8/2016. Undetectable  11/2016.  690 intU  5/2017. HCV RNA Log 7 intU, HCV genotype 2    LIVER HISTOLOGY:  6/2015. Fib[facility]. EkPa was 18.8. Suggested fibrosis level is F4.  08/2017. Liver biopsy by Dr. Yahir Del Rio. Suspicious for hepatocellular carcinoma, but there is simply insufficient quantity of material for a firm diagnosis. ENDOSCOPIC PROCEDURES:  8/2015. EGD by Dr. Anastasia Martínez. Small esophageal varices. No banding performed. Repeat in 2 years. RADIOLOGY:  12/2012. Ultrasound of liver. Echogenic consistent with cirrhosis. 4.5x3.6 cm right lobe complex mass lesion. No dilated bile ducts. No ascites. 12/2012. CT scan abdomen with and without IV contrast.  Evidence of prior right lobe resection. The liver otherwise appears normal.  No liver mass lesions. No dilated bile ducts. No bile duct strictures. No ascites. 6/2015. Ultrasound of liver. Echogenic consistent with chronic liver disease. No liver mass lesions. No dilated bile ducts. No ascites. Post surgical changes. 5/2016. Ultrasound of liver. Echogenic consistent with cirrhosis. No liver mass lesions. No dilated bile ducts. No ascites. 2017. Dynamic MRI of the abdomen. Consistent with cirrhosis. 2.8 cm enhancing subcapsular mass right hepatic lobe. Also 15 mm solid mass. 2017. Abdominal CT w/wo contrast.  Likely infiltrative lesion in segment 6 and 7 of the right hepatic lobe. 2017. MRI. \"Good early response\". 2018. Liver/Pelvic Ct w/wo IV contrast.  Large infiltrative hepatocellular carcinoma with findings suggesting interval treatment.  Presence of residual tumor is equivocal.  2019. Abdominal CT ([facility]). Liver: Postsurgical changes of partial resection of the right lobe of the liver are noted. No suspicious lesion appreciated within the liver. OTHER TESTIN2017. Liver biopsy. The EMR in this case has been reviewed. The clinical story is quite compelling for a consideration of hepatocellular carcinoma. The biopsy sample does not contain enough of the atypical material for a firm diagnosis. ASSESSMENT AND PLAN:  Chronic HCV with cirrhosis, HCC and chronic hepatitis C. The most recent laboratory studies indicate that the liver transaminases are elevated, alkaline phosphatase is normal, tests of hepatic synthetic and metabolic function are normal, and the platelet count is normal.  INR is prolonged. Will perform laboratory testing to monitor liver function and degree of liver injury. This will include hepatic panel, a CBC w/ diff, a BMP, an AFP-L3%, a PT/INR, and an HCV RNA. Complications of cirrhosis were discussed in detail. We discussed thrombocytopenia, portal hypertension, varices, GI bleeding, peripheral edema, ascites, hepatic encephalopathy, and hepatocellular carcinoma. We discussed the need for follow ups on a regular basis, at 3 month intervals to monitor for complications. We discussed the need for every 6 month liver imaging studies.      The patient underwent Y90 therasphere radioembolization of right hepatic lobe tumor on 09/2017 by Dr. Erasto Mariee. The patient returned to Dr. Mel Woodard one month after the procedure to have imaging completed, but \"freaked out\" during MRI and the imaging could not be completed. It appears that the patient did eventually have MRI completed in 11/2017 and the demonstrated a good early response to the Y-90. Imaging has been repeated in 04/2018 with CT and in 06/2018 with MRI. No changes, stable post Y-90 changes. No new lesions. Most recent imaging was completed at [facility] with abdominal CT. Liver demonstrates postsurgical changes of partial resection of the right lobe of the liver are noted. No suspicious lesion appreciated within the liver. The patient spends a lot of time in [place] and has established care there with [facility]. We have no reports from them. He was seen by Dr. Erasto Mariee in 06/2018 and was to follow up with him in October/November, 2018. We have no report. The patient was admitted to [facility] from 02/11 to 02/14/2019 for HE. He is now on lactulose and the HE is well controlled. He is lucid today. Ammonia level was 49 on 02/21/2019. The patient continues to complain of severe right upper quadrant pain. He states that this is getting worse. 15 mg oxycodone IR provided. He already has the Naloxone 4 mg nasal spray. The patient was previously treated for HCV with several different therapies including PEGINF and RBV, SOF and RBV, and most recently Epclusa and RBV. He has not achieved SVR. The patient has HCV genotype 2. He began HCV therapy for the fourth time on 08/30/2018 with once daily Vosevi. Sanna Flash is a fixed dose combination of sofosbuvir (a polymerase inhibitor), velpatasvir (an NS5A inhibitor), and voxilaprevir (an NS3/4A protease inhibitor). The regime if for one tablet a day for 12 weeks. He completed the 12 week regime on 11/21/2018 and the HCV is now cured. SVR 12. The patient had no treatment related complaints. He denies missing doses of the medication. The patient was counseled regarding alcohol consumption. Vaccination for viral hepatitis A and B is not needed. The patient has serologic evidence of prior exposure or vaccination with immunity. All of the above issues were discussed with the patient. All questions were answered. The patient expressed a clear understanding of the above. 30 minutes total time spent with this patient with more than 50% of this time spent counseling and coordinating care as described above. [address] [address] in 12 weeks.       Prince Milan NP   [place] of [address] [place] [address], [address] [place] [address] Ruth Lopez, [address] [place] [address]   [phone]

## 2019-02-21 NOTE — PROGRESS NOTES
1. Have you been to the ER, urgent care clinic since your last visit? Hospitalized since your last visit? Yes When: Az Fitzgerald 02/2019    2. Have you seen or consulted any other health care providers outside of the [address] [place] [address] since your last visit? Include any pap smears or colon screening.  No

## 2019-02-21 NOTE — COMMUNICATION BODY
[address], [place], [address], [facility], [place]       March Aris, HANY Amaya, LINH Ray, CARLEYP-ANTONIO Pollock, HANY San, HANY [facility] [address]    at [address], [address] [address]  [address].    [phone]    FAX: [address], [address]    [phone]    FAX: [phone] [facility]. : 1945      To whom this concerns,    The above named patient is under my care at [facility]. He is being followed here for cirrhosis, treated hepatitis C, and treated liver cancer. The patient complains of relentless, severe right upper quadrant pain, likely related to the liver cancer. I provide Mr. Mary Miranda with 15 mg Oxy-IR for pain control about once a every month. He has not abused this medication and he has signed a narcotic practice agreement with this office. If you have any questions or concerns, please do not hesitate to contact us and either my staff or I will assist you to the best of our abilities.       Sincerely,           KEITH Baldwin-C  [facility] [place] of [facility]  [address], [address] [address] Venita Acosta, [address] [facility]   [phone]

## 2019-02-25 LAB
AFP L3 MFR SERPL: NORMAL % (ref 0–9.9)
AFP SERPL-MCNC: 3.5 NG/ML (ref 0–8)
ALBUMIN SERPL-MCNC: 3.7 G/DL (ref 3.5–4.8)
ALP SERPL-CCNC: 73 IU/L (ref 39–117)
ALT SERPL-CCNC: 30 IU/L (ref 0–44)
AMMONIA PLAS-MCNC: NORMAL UG/DL (ref 27–102)
AST SERPL-CCNC: 31 IU/L (ref 0–40)
BASOPHILS # BLD AUTO: 0 X10E3/UL (ref 0–0.2)
BASOPHILS NFR BLD AUTO: 0 %
BILIRUB DIRECT SERPL-MCNC: 0.18 MG/DL (ref 0–0.4)
BILIRUB SERPL-MCNC: 0.6 MG/DL (ref 0–1.2)
BUN SERPL-MCNC: 12 MG/DL (ref 8–27)
BUN/CREAT SERPL: 18 (ref 10–24)
CALCIUM SERPL-MCNC: 9.5 MG/DL (ref 8.6–10.2)
CHLORIDE SERPL-SCNC: 105 MMOL/L (ref 96–106)
CO2 SERPL-SCNC: 19 MMOL/L (ref 20–29)
CREAT SERPL-MCNC: 0.66 MG/DL (ref 0.76–1.27)
EOSINOPHIL # BLD AUTO: 0.1 X10E3/UL (ref 0–0.4)
EOSINOPHIL NFR BLD AUTO: 1 %
ERYTHROCYTE [DISTWIDTH] IN BLOOD BY AUTOMATED COUNT: 13.4 % (ref 12.3–15.4)
GLUCOSE SERPL-MCNC: 200 MG/DL (ref 65–99)
HCT VFR BLD AUTO: 41.6 % (ref 37.5–51)
HCV RNA SERPL QL NAA+PROBE: NEGATIVE
HGB BLD-MCNC: 14 G/DL (ref 13–17.7)
IMM GRANULOCYTES # BLD AUTO: 0.1 X10E3/UL (ref 0–0.1)
IMM GRANULOCYTES NFR BLD AUTO: 2 %
INR PPP: 1.2 (ref 0.8–1.2)
LYMPHOCYTES # BLD AUTO: 1.2 X10E3/UL (ref 0.7–3.1)
LYMPHOCYTES NFR BLD AUTO: 21 %
MCH RBC QN AUTO: 33.4 PG (ref 26.6–33)
MCHC RBC AUTO-ENTMCNC: 33.7 G/DL (ref 31.5–35.7)
MCV RBC AUTO: 99 FL (ref 79–97)
MONOCYTES # BLD AUTO: 0 X10E3/UL (ref 0.1–0.9)
MONOCYTES NFR BLD AUTO: 0 %
NEUTROPHILS # BLD AUTO: 4.3 X10E3/UL (ref 1.4–7)
NEUTROPHILS NFR BLD AUTO: 76 %
PLATELET # BLD AUTO: 221 X10E3/UL (ref 150–379)
POTASSIUM SERPL-SCNC: 4.2 MMOL/L (ref 3.5–5.2)
PROT SERPL-MCNC: 6.9 G/DL (ref 6–8.5)
PROTHROMBIN TIME: 12.2 SEC (ref 9.1–12)
RBC # BLD AUTO: 4.19 X10E6/UL (ref 4.14–5.8)
SODIUM SERPL-SCNC: 140 MMOL/L (ref 134–144)
WBC # BLD AUTO: 5.5 X10E3/UL (ref 3.4–10.8)

## 2019-06-26 ENCOUNTER — TELEPHONE (OUTPATIENT)
Dept: HEMATOLOGY | Age: 74
End: 2019-06-26

## 2019-06-26 NOTE — TELEPHONE ENCOUNTER
Patient doesn't want to scheduled a appt because it's to far out, but want to know if he can  his prescription. ?

## 2019-06-29 DIAGNOSIS — C22.0 HEPATOCELLULAR CARCINOMA (HCC): ICD-10-CM

## 2019-06-29 RX ORDER — OXYCODONE HYDROCHLORIDE 15 MG/1
15 TABLET ORAL
Qty: 120 TAB | Refills: 0 | Status: SHIPPED | OUTPATIENT
Start: 2019-06-29 | End: 2019-07-29

## 2019-08-20 ENCOUNTER — HOSPITAL ENCOUNTER (OUTPATIENT)
Dept: LAB | Age: 74
Discharge: HOME | End: 2019-08-20
Payer: MEDICARE

## 2019-08-20 ENCOUNTER — OFFICE VISIT (OUTPATIENT)
Dept: HEMATOLOGY | Age: 74
End: 2019-08-20

## 2019-08-20 VITALS
BODY MASS INDEX: 31.92 KG/M2 | OXYGEN SATURATION: 94 % | RESPIRATION RATE: 18 BRPM | TEMPERATURE: 98.5 F | HEIGHT: 70 IN | SYSTOLIC BLOOD PRESSURE: 127 MMHG | HEART RATE: 60 BPM | WEIGHT: 223 LBS | DIASTOLIC BLOOD PRESSURE: 83 MMHG

## 2019-08-20 DIAGNOSIS — C22.0 HEPATOCELLULAR CARCINOMA (HCC): ICD-10-CM

## 2019-08-20 DIAGNOSIS — C22.0 HEPATOCELLULAR CARCINOMA (HCC): Primary | ICD-10-CM

## 2019-08-20 LAB
ALBUMIN SERPL-MCNC: 3.8 G/DL (ref 3.4–5)
ALBUMIN/GLOB SERPL: 0.8 {RATIO} (ref 0.8–1.7)
ALP SERPL-CCNC: 87 U/L (ref 45–117)
ALT SERPL-CCNC: 44 U/L (ref 16–61)
AMMONIA PLAS-SCNC: 44 UMOL/L (ref 11–32)
ANION GAP SERPL CALC-SCNC: 13 MMOL/L (ref 3–18)
AST SERPL-CCNC: 37 U/L (ref 10–38)
BASOPHILS # BLD: 0 K/UL (ref 0–0.1)
BASOPHILS NFR BLD: 0 % (ref 0–2)
BILIRUB DIRECT SERPL-MCNC: 0.2 MG/DL (ref 0–0.2)
BILIRUB SERPL-MCNC: 0.8 MG/DL (ref 0.2–1)
BUN SERPL-MCNC: 27 MG/DL (ref 7–18)
BUN/CREAT SERPL: 22 (ref 12–20)
CALCIUM SERPL-MCNC: 9.6 MG/DL (ref 8.5–10.1)
CHLORIDE SERPL-SCNC: 95 MMOL/L (ref 100–111)
CO2 SERPL-SCNC: 24 MMOL/L (ref 21–32)
CREAT SERPL-MCNC: 1.22 MG/DL (ref 0.6–1.3)
DIFFERENTIAL METHOD BLD: ABNORMAL
EOSINOPHIL # BLD: 0.1 K/UL (ref 0–0.4)
EOSINOPHIL NFR BLD: 1 % (ref 0–5)
ERYTHROCYTE [DISTWIDTH] IN BLOOD BY AUTOMATED COUNT: 13.1 % (ref 11.6–14.5)
GLOBULIN SER CALC-MCNC: 4.5 G/DL (ref 2–4)
GLUCOSE SERPL-MCNC: 437 MG/DL (ref 74–99)
HCT VFR BLD AUTO: 45.3 % (ref 36–48)
HGB BLD-MCNC: 15.7 G/DL (ref 13–16)
INR PPP: 1.9 (ref 0.8–1.2)
LYMPHOCYTES # BLD: 0.8 K/UL (ref 0.9–3.6)
LYMPHOCYTES NFR BLD: 12 % (ref 21–52)
MCH RBC QN AUTO: 33.6 PG (ref 24–34)
MCHC RBC AUTO-ENTMCNC: 34.7 G/DL (ref 31–37)
MCV RBC AUTO: 97 FL (ref 74–97)
MONOCYTES # BLD: 0.7 K/UL (ref 0.05–1.2)
MONOCYTES NFR BLD: 10 % (ref 3–10)
NEUTS SEG # BLD: 4.9 K/UL (ref 1.8–8)
NEUTS SEG NFR BLD: 77 % (ref 40–73)
PLATELET # BLD AUTO: 194 K/UL (ref 135–420)
PMV BLD AUTO: 10.4 FL (ref 9.2–11.8)
POTASSIUM SERPL-SCNC: 4.3 MMOL/L (ref 3.5–5.5)
PROT SERPL-MCNC: 8.3 G/DL (ref 6.4–8.2)
PROTHROMBIN TIME: 21.3 SEC (ref 11.5–15.2)
RBC # BLD AUTO: 4.67 M/UL (ref 4.7–5.5)
SODIUM SERPL-SCNC: 132 MMOL/L (ref 136–145)
WBC # BLD AUTO: 6.4 K/UL (ref 4.6–13.2)

## 2019-08-20 PROCEDURE — 82140 ASSAY OF AMMONIA: CPT

## 2019-08-20 PROCEDURE — 85025 COMPLETE CBC W/AUTO DIFF WBC: CPT

## 2019-08-20 PROCEDURE — 87521 HEPATITIS C PROBE&RVRS TRNSC: CPT

## 2019-08-20 PROCEDURE — 80048 BASIC METABOLIC PNL TOTAL CA: CPT

## 2019-08-20 PROCEDURE — 82107 ALPHA-FETOPROTEIN L3: CPT

## 2019-08-20 PROCEDURE — 36415 COLL VENOUS BLD VENIPUNCTURE: CPT

## 2019-08-20 PROCEDURE — 85610 PROTHROMBIN TIME: CPT

## 2019-08-20 PROCEDURE — 80076 HEPATIC FUNCTION PANEL: CPT

## 2019-08-20 RX ORDER — FENTANYL 25 UG/1
1 PATCH TRANSDERMAL
Qty: 10 PATCH | Refills: 0 | Status: SHIPPED | OUTPATIENT
Start: 2019-08-20 | End: 2019-09-19

## 2019-08-20 RX ORDER — OXYCODONE HYDROCHLORIDE 15 MG/1
15 TABLET ORAL
Qty: 4 TAB | Refills: 0 | Status: SHIPPED | OUTPATIENT
Start: 2019-08-20 | End: 2019-08-23

## 2019-08-20 RX ORDER — LISINOPRIL 2.5 MG/1
2.5 TABLET ORAL DAILY
COMMUNITY
Start: 2019-08-12

## 2019-08-20 NOTE — PROGRESS NOTES
Noe Wright. is a 74 y.o. male      1. Have you been to the ER, urgent care clinic or hospitalized since your last visit? NO.     2. Have you seen or consulted any other health care providers outside of the [address] [place] [address] since your last visit (Include any pap smears or colon screening)?  NO          Learning Assessment 11/30/2015   PRIMARY LEARNER Patient   PRIMARY LANGUAGE ENGLISH   LEARNER PREFERENCE PRIMARY LISTENING   ANSWERED BY patient   RELATIONSHIP SELF

## 2019-08-21 LAB
AFP L3 MFR SERPL: NORMAL % (ref 0–9.9)
AFP SERPL-MCNC: 3.2 NG/ML (ref 0–8)

## 2019-08-23 LAB
HCV RNA SERPL NAA+PROBE-LOG IU: NOT DETECTED HCV LOG 10IU/ML
HCV RNA SERPL PROBE AMP-ACNC: NOT DETECTED HCVIU/ML
HCV RNA SERPL QL NAA+PROBE: NOT DETECTED

## 2019-08-30 NOTE — PROGRESS NOTES
[address] Jarod Ross MD, [facility], [place], [place]       Charlotte Garcia, HANY Marks, LINH De La Torre, [facility]   Tanisha Delcid, HANY Ponce NP        at [address], [address] [address]     [phone]     FAX: [phone]    at [address], [address]     [phone]     FAX: [phone]       Patient Care Team:  Lucius Banks MD as PCP - General (Unknown Physician Specialty)  Oly Donohue MD (Radiology)      Problem List  Date Reviewed: 2/21/2019          Codes Class Noted    Severe obesity (BMI 35.0-39. 9) ICD-10-CM: E66.01  ICD-9-CM: 278.01  7/23/2018        Hepatocellular carcinoma ([facility] ICD-10-CM: C22.0  ICD-9-CM: 155.0  9/6/2017        A-fib ([facility] ICD-10-CM: I48.91  ICD-9-CM: 427.31  11/30/2015        Cirrhosis ([facility] ICD-10-CM: K74.60  ICD-9-CM: 571.5  6/30/2015        Elevated prostate specific antigen (PSA) ICD-10-CM: R97.20  ICD-9-CM: 790.93  5/10/2014        H/O resection of liver ICD-10-CM: Z90.49  ICD-9-CM: V15.29  12/24/2012    Overview Signed 12/24/2012  1:22 AM by Earnest Hernandez MD     Partial right lobectomy. Reason for this is not known. Chronic hepatitis C ([facility] ICD-10-CM: B18.2  ICD-9-CM: 070.54  12/5/2012    Overview Addendum 6/30/2016  9:26 AM by Earnest Hernandez MD     Peginterferon/ribavirin ~7619. Treated 6 months.   SOF/RBV relapse             Hypertension ICD-10-CM: I10  ICD-9-CM: 401.9  12/5/2012        PAT (paroxysmal atrial tachycardia) ([facility] ICD-10-CM: I47.1  ICD-9-CM: 427.0  12/5/2012    Overview Signed 12/5/2012  2:12 PM by Earnest Hernandez MD     Ablation 2002             Colon cancer [facility]) ICD-10-CM: C18.9  ICD-9-CM: 153.9  12/5/2012    Overview Signed 12/5/2012  2:13 PM by Earnest Hernandez MD     Partial colectomy 2004             S/P knee surgery ICD-10-CM: Z98.890  ICD-9-CM: V45.89  12/5/2012    Overview Signed 12/5/2012  2:14 PM by Mo Olson MD     2010                   Jannette Coulter. returns to the [address] for management of cirrhosis secondary to chronic HCV. Mr. Singh Hendricks has a  liver cancer which is being treated by Dr. Tanya Yu. He began HCV treatment for the fourth time on 08/30/2018 with once daily Vosevi. He completed the 12 weeks regime on 11/21/2018. He was SVR 12 on 02/21/2019. HCV is finally cured. The active problem list, all pertinent past medical history, medications, liver histology, endoscopic studies, radiologic findings and laboratory findings related to the liver disorder were reviewed with the patient. The patient was admitted to [facility] from 02/11/2019 through 02/14/2019 for hepatic encephalopathy. He is now taking lactulose and is lucid. The patient is a 74 y.o.  male who first found out he had HCV in the early 2000s when he was living in [place]. He was most likely exposed to [address] in the 1970s when he had an episode of acute icteric hepatitis. While living in [place] he was treated with peginterferon and ribavirin in the 2000s. He was treated with SOF and RBV for 12 weeks in 2015. He failed to achieve SVR with these treatments. He was treated with Epclusa and bid Ribavirin between 7/24/2016 -10/15/2016. He completed the prescribed 12 weeks of therapy. He had a virologic response but never became HCV RNA undetectable. The patient underwent a right lobe segmental liver resection while living in [place]. The pathology is not known but the patient states he was told this was benign. Imaging of the liver was performed in 08/2017 with CT. Likely infiltrative lesion in segment 6 and 7 of the right hepatic lobe. This has been treated with Y-90 in 09/2017. MRI in 11/2017 demonstrated a good early response.       Imaging has been repeated in 04/2018 with CT and in 06/2018 with MRI. No changes, stable post Y-90 changes. No new lesions. Most recent imaging was completed at [facility] with abdominal CT. Liver demonstrates postsurgical changes of partial resection of the right lobe of the liver are noted. No suspicious lesion appreciated within the liver. A Fibroscan demonstrated cirrhosis with a value of 18.8 kPa    The patient notes fatigue, severe pain in the right side over the liver, and swelling of the lower extremities. He reports that the pain is getting worse. The patient has not experienced problems concentrating, swelling of the abdomen, hematemesis, hematochezia. The patient has Moderate limitations in functional activities which can be attributed to the liver disease and to other medical problems that are not related to the liver disease. ALLERGIES:  No Known Allergies    MEDICATIONS  Current Outpatient Medications   Medication Sig    lisinopril (PRINIVIL, ZESTRIL) 2.5 mg tablet Take 2.5 mg by mouth daily.  rifAXIMin (XIFAXAN) 550 mg tablet Take 1 Tab by mouth two (2) times a day.  fentaNYL (DURAGESIC) 25 mcg/hr PATCH 1 Patch by TransDERmal route every seventy-two (72) hours for 30 days. Max Daily Amount: 1 Patch. Indications: Severe Pain with Opioid Tolerance    lactulose (CHRONULAC) 10 gram/15 mL solution Take 20 mL by mouth two (2) times a day.  metoprolol succinate (TOPROL-XL) 25 mg XL tablet TK 1 T PO D    pioglitazone (ACTOS) 30 mg tablet TK 1 T PO  D    furosemide (LASIX) 40 mg tablet Take  by mouth daily.  tamsulosin (FLOMAX) 0.4 mg capsule Take 0.4 mg by mouth daily.  warfarin (COUMADIN) 5 mg tablet Take 7.5 mg by mouth daily.  glimepiride (AMARYL) 2 mg tablet Take  by mouth every morning.  metFORMIN (GLUCOPHAGE) 500 mg tablet Take  by mouth daily (with breakfast).  spironolactone (ALDACTONE) 25 mg tablet Take  by mouth daily.     nystatin-triamcinolone (MYCOLOG) 100,000-0.1 unit/gram-% ointment Apply  to affected area two (2) times a day. No current facility-administered medications for this visit. SYSTEM REVIEW NOT RELATED TO LIVER DISEASE OR REVIEWED ABOVE:  Constitution systems: Negative for fever, chills, weight gain, weight loss. Eyes: Negative for visual changes. ENT: Negative for sore throat, painful swallowing. Respiratory: Negative for cough, hemoptysis, SOB. Cardiology: Negative for chest pain, palpitations. GI:  Negative for constipation or diarrhea. Positive for low right abdominal pain. : Negative for urinary frequency, dysuria, hematuria, nocturia. Skin: Negative for rash. Hematology: Negative for easy bruising, blood clots. Musculo-skeletal: Negative for back pain, muscle pain, weakness. Neurologic: Negative for headaches, dizziness, vertigo, memory problems not related to HE. Psychology: Negative for anxiety, depression. FAMILY HISTORY  There is no family members with HCV or liver disease    SOCIAL HISTORY:  The patient is . The patient has 3 children, 7 grandchildren. The patient has never used tobacco products. The patient has been abstinent from alcohol since 1970s. The patient used to work as . The patient retired in 2005. PHYSICAL EXAMINATION:  Visit Vitals  /83 (BP 1 Location: Right arm, BP Patient Position: Sitting)   Pulse 60   Temp 98.5 °F (36.9 °C) (Tympanic)   Resp 18   Ht 5' 10\" (1.778 m)   Wt 223 lb (101.2 kg)   SpO2 94%   BMI 32.00 kg/m²     General: No acute distress. Eyes: Sclera anicteric. ENT: No oral lesions. Thyroid normal.  Nodes: No adenopathy. Skin: No spider angiomata. No jaundice. No palmar erythema. Respiratory: Lungs clear to auscultation. Cardiovascular: Regular heart rate. No murmurs. No JVD. Abdomen: Soft non-tender. Liver size normal to percussion/palpation. Spleen not palpable. No obvious ascites. Extremities: No lower edema. No muscle wasting.   No gross arthritic changes. Neurologic: Alert and oriented. Cranial nerves grossly intact. No asterixsis. LABORATORY STUDIES:   Liver [place] of [address] Units 8/20/2019 2/21/2019   WBC 4.6 - 13.2 K/uL 6.4 5.5   ANC 1.8 - 8.0 K/UL 4.9 4.3   HGB 13.0 - 16.0 g/dL 15.7 14.0    - 420 K/uL 194 221   INR 0.8 - 1.2   1.9 (H) 1.2   AST 10 - 38 U/L 37 31   ALT 16 - 61 U/L 44 30   Alk Phos 45 - 117 U/L 87 73   Bili, Total 0.2 - 1.0 MG/DL 0.8 0.6   Bili, Direct 0.0 - 0.2 MG/DL 0.2 0.18   Albumin 3.4 - 5.0 g/dL 3.8 3.7   BUN 7.0 - 18 MG/DL 27 (H) 12   Creat 0.6 - 1.3 MG/DL 1.22 0.66 (L)   Na 136 - 145 mmol/L 132 (L) 140   K 3.5 - 5.5 mmol/L 4.3 4.2   Cl 100 - 111 mmol/L 95 (L) 105   CO2 21 - 32 mmol/L 24 19 (L)   Glucose 74 - 99 mg/dL 437 (HH) 200 (H)   Ammonia 11 - 32 UMOL/L 44 (H) CANCELED     CANCER SCREENING:  Cancer Screening Latest Ref Rng & Units 2/21/2019  10/4/2018 6/18/2018   AFP, Serum 0.0 - 8.0 ng/mL 3.5  4.7 5.0   AFP-L3% 0.0 - 9.9 % Comment  7.2 9.2     SEROLOGIES:  8/2012. HBA1C 6.7    Serologies Latest Ref Rng 5/19/2015 12/5/2012   Hep A Ab, Total Negative  Positive (A)   Hep B Surface Ag Negative  Negative   Hep B Core Ab, Total Negative  Positive (A)   Hep B Surface Ab 0.00 - 0.99 Index Value  0.15   Hep C Genotype  2 CANCELED   HCV RT-PCR, Quant  [id number]    IL28B Genotype   TT genotype     5/2015. HCV RNA Positive  10/2015. HCV RNA undetectable  12/2015. HCV RNA log 6.7 intU  5/2016. HCV RNA Log 6.8 intU  6/2016. HCV RNA positive  8/2016. Undetectable  11/2016.  690 intU  5/2017. HCV RNA Log 7 intU, HCV genotype 2    LIVER HISTOLOGY:  6/2015. FibroScan performed at [address]. EkPa was 18.8. Suggested fibrosis level is F4.  08/2017. Liver biopsy by Dr. Trav Luz. Suspicious for hepatocellular carcinoma, but there is simply insufficient quantity of material for a firm diagnosis. ENDOSCOPIC PROCEDURES:  8/2015. EGD by Dr. Esther Ceja.   Small esophageal varices. No banding performed. Repeat in 2 years. RADIOLOGY:  2012. Ultrasound of liver. Echogenic consistent with cirrhosis. 4.5x3.6 cm right lobe complex mass lesion. No dilated bile ducts. No ascites. 2012. CT scan abdomen with and without IV contrast.  Evidence of prior right lobe resection. The liver otherwise appears normal.  No liver mass lesions. No dilated bile ducts. No bile duct strictures. No ascites. 2015. Ultrasound of liver. Echogenic consistent with chronic liver disease. No liver mass lesions. No dilated bile ducts. No ascites. Post surgical changes. 2016. Ultrasound of liver. Echogenic consistent with cirrhosis. No liver mass lesions. No dilated bile ducts. No ascites. 2017. Dynamic MRI of the abdomen. Consistent with cirrhosis. 2.8 cm enhancing subcapsular mass right hepatic lobe. Also 15 mm solid mass. 2017. Abdominal CT w/wo contrast.  Likely infiltrative lesion in segment 6 and 7 of the right hepatic lobe. 2017. MRI. \"Good early response\". 2018. Liver/Pelvic Ct w/wo IV contrast.  Large infiltrative hepatocellular carcinoma with findings suggesting interval treatment.  Presence of residual tumor is equivocal.  2019. Abdominal CT ([facility]). Liver: Postsurgical changes of partial resection of the right lobe of the liver are noted. No suspicious lesion appreciated within the liver. OTHER TESTIN2017. Liver biopsy. The EMR in this case has been reviewed. The clinical story is quite compelling for a consideration of hepatocellular carcinoma. The biopsy sample does not contain enough of the atypical material for a firm diagnosis. ASSESSMENT AND PLAN:  Chronic HCV with cirrhosis, HCC and chronic hepatitis C.   The most recent laboratory studies indicate that the liver transaminases are elevated, alkaline phosphatase is normal, tests of hepatic synthetic and metabolic function are normal, and the platelet count is normal.  INR is prolonged. Will perform laboratory testing to monitor liver function and degree of liver injury. This will include hepatic panel, a CBC w/ diff, a BMP, an AFP-L3%, a PT/INR, and an HCV RNA. Complications of cirrhosis were discussed in detail. We discussed thrombocytopenia, portal hypertension, varices, GI bleeding, peripheral edema, ascites, hepatic encephalopathy, and hepatocellular carcinoma. We discussed the need for follow ups on a regular basis, at 3 month intervals to monitor for complications. We discussed the need for every 6 month liver imaging studies. The patient underwent Y90 therasphere radioembolization of right hepatic lobe tumor on 09/2017 by Dr. Olivia Mejias. The patient returned to Dr. Karely Browning one month after the procedure to have imaging completed, but \"freaked out\" during MRI and the imaging could not be completed. It appears that the patient did eventually have MRI completed in 11/2017 and the demonstrated a good early response to the Y-90. He reports that he has not been back to see Dr. Olivia Mejias since the procedure. Imaging has been repeated in 04/2018 with CT and in 06/2018 with MRI. No changes, stable post Y-90 changes. No new lesions. Most recent imaging was completed at [facility] with abdominal CT. Liver demonstrates postsurgical changes of partial resection of the right lobe of the liver are noted. No suspicious lesion appreciated within the liver. Repeat MRI was ordered today. The patient spends a lot of time in [place] and has established care there with [facility] [place]note 2]. We have no reports from them. The patient was admitted to [facility] from 02/11 to 02/14/2019 for HE. He is now on lactulose and the HE is well controlled. He is lucid today. Ammonia level was 49 on 02/21/2019. The patient continues to complain of severe right upper quadrant pain. He states that this is getting worse.    15 mg oxycodone IR was previously provided, but he states that this is no longer effective. I wrote a script for Fentanyl 25 mcg patches and gave him 4 more 15 mg oxycodone's until he can get the patches. It takes 12 hours for the patches to begin working. He already has the Naloxone 4 mg nasal spray. The patient was previously treated for HCV with several different therapies including PEGINF and RBV, SOF and RBV, and most recently Epclusa and RBV. He has not achieved SVR. The patient has HCV genotype 2. He began HCV therapy for the fourth time on 08/30/2018 with once daily Vosevi. Dorice Lapidus is a fixed dose combination of sofosbuvir (a polymerase inhibitor), velpatasvir (an NS5A inhibitor), and voxilaprevir (an NS3/4A protease inhibitor). The regime if for one tablet a day for 12 weeks. He completed the 12 week regime on 11/21/2018 and the HCV is now cured. SVR 12. The patient had no treatment related complaints. He denies missing doses of the medication. The patient was counseled regarding alcohol consumption. Vaccination for viral hepatitis A and B is not needed. The patient has serologic evidence of prior exposure or vaccination with immunity. All of the above issues were discussed with the patient. All questions were answered. The patient expressed a clear understanding of the above. 30 minutes total time spent with this patient with more than 50% of this time spent counseling and coordinating care as described above. Addendum:  The patient came in on 08/29/2019 seeking more narcotic pain medication. I reviewed his [place] . I have also talked to his one of the pharmacy he is using for pain medications. He just got Norco just two days ago from another provider. He has never mentioned another provider giving him pain medications. He has never told this office that he is being seen by cardio (Dr. Lola Fritz).   I added her name to hs care team.   He reported that he was not wearing the prescribed fentanyl patch because \"it doesn't work\". I explained that I am not his pain management physician and that he needs to establish care with someone who can better manage his pain. I explained that I will not provide any oxycodone at least until he finishes with the 10 fentanyl patches he received from the pharmacy on 08/20/2019. I further discussed the need for better blood glucose control. His glucose from labs on 08/20/2019 was 437. He stated that he is being evaluated for liver transplant at [facility]. I explained that he is not eligible for transplant and that we work very closely with [facility] and surely would have been consulted if they are evaluating him. He expressed understanding of all the above. He appeared to accept the restrictions on my providing him with narcotics. [facility] in 12 weeks.       Heath Avelar NP   Liver [place] [facility], [address] [place] [address] De[address], [facility]   [phone]

## 2019-09-17 DIAGNOSIS — K76.82 HEPATIC ENCEPHALOPATHY: Primary | ICD-10-CM

## 2021-09-22 PROBLEM — Z86.010 HISTORY OF COLON POLYPS: Chronic | Status: ACTIVE | Noted: 2021-09-22

## 2022-03-19 PROBLEM — Z86.010 HISTORY OF COLON POLYPS: Status: ACTIVE | Noted: 2021-09-22

## 2022-03-19 PROBLEM — C22.0 HEPATOCELLULAR CARCINOMA (HCC): Status: ACTIVE | Noted: 2017-09-06

## 2023-02-02 RX ORDER — METOPROLOL SUCCINATE 25 MG/1
TABLET, EXTENDED RELEASE ORAL
COMMUNITY
Start: 2017-04-17

## 2023-02-02 RX ORDER — GLIMEPIRIDE 2 MG/1
TABLET ORAL
COMMUNITY

## 2023-02-02 RX ORDER — LISINOPRIL 2.5 MG/1
2.5 TABLET ORAL DAILY
COMMUNITY
Start: 2019-08-12

## 2023-02-02 RX ORDER — FUROSEMIDE 40 MG/1
TABLET ORAL DAILY
COMMUNITY

## 2023-02-02 RX ORDER — PIOGLITAZONEHYDROCHLORIDE 30 MG/1
TABLET ORAL
COMMUNITY
Start: 2017-06-17

## 2023-02-02 RX ORDER — TAMSULOSIN HYDROCHLORIDE 0.4 MG/1
0.4 CAPSULE ORAL DAILY
COMMUNITY

## 2023-02-02 RX ORDER — OXYCODONE HYDROCHLORIDE 15 MG/1
15 TABLET ORAL DAILY
COMMUNITY

## 2023-02-02 RX ORDER — WARFARIN SODIUM 5 MG/1
7.5 TABLET ORAL DAILY
COMMUNITY

## 2023-02-02 RX ORDER — LACTULOSE 10 G/15ML
20 SOLUTION ORAL 2 TIMES DAILY
COMMUNITY
Start: 2019-02-14

## 2023-02-02 RX ORDER — SPIRONOLACTONE 25 MG/1
TABLET ORAL DAILY
COMMUNITY

## 2025-03-31 NOTE — H&P
OUTPATIENT HISTORY AND PHYSICAL      Today 8/17/2017     Indication/Symptoms:   Ye Delgado is a 66 y.o. male with a history of cirrhosis 2/2 chronic hepatitis c infection s/p right segmental lobe liver resection of a benign lesion (per patient) many years ago. He now presents to IR for an image-guided biopsy of a right hepatic lobe lesion. Patient has been NPO since midnight and has held his Coumadin for 7 days. Current Meds:    Prior to Admission medications    Medication Sig Start Date End Date Taking? Authorizing Provider   metoprolol succinate (TOPROL-XL) 25 mg XL tablet TK 1 T PO D 4/17/17  Yes Historical Provider   pioglitazone (ACTOS) 30 mg tablet TK 1 T PO  D 6/17/17  Yes Historical Provider   potassium chloride SA (MICRO-K) 10 mEq capsule TK 1 C PO D 6/18/17  Yes Historical Provider   oxyCODONE-acetaminophen (PERCOCET 10)  mg per tablet Take 1 Tab by mouth every eight (8) hours as needed for Pain. Max Daily Amount: 3 Tabs. 6/28/17  Yes Saul Flores NP   potassium chloride SR (KLOR-CON 10) 10 mEq tablet Take  by mouth Every Mon, Wed & Sun.   Yes Historical Provider   furosemide (LASIX) 40 mg tablet Take  by mouth daily. Yes Historical Provider   tamsulosin (FLOMAX) 0.4 mg capsule Take 0.4 mg by mouth daily. Yes Historical Provider   warfarin (COUMADIN) 5 mg tablet Take 7.5 mg by mouth daily. Yes Historical Provider   lisinopril (PRINIVIL, ZESTRIL) 5 mg tablet Take  by mouth daily. Indications: HYPERTENSION   Yes Historical Provider   glimepiride (AMARYL) 2 mg tablet Take  by mouth every morning. Yes Historical Provider   metFORMIN (GLUCOPHAGE) 500 mg tablet Take  by mouth daily (with breakfast). Yes Historical Provider   spironolactone (ALDACTONE) 25 mg tablet Take  by mouth daily.    Yes Historical Provider       Allergies:    No Known Allergies    Comorbid Conditions:    Past Medical History:   Diagnosis Date    Colon cancer ([facility]     Diabetes ([facility] 62y/o    Elevated PSA     Headache     Hemangioma     Hep C w/o coma, chronic (HCC)     Hypertension 64y/o          Past Surgical History:   Procedure Laterality Date    COLONOSCOPY N/A 6/20/2016    COLONOSCOPY performed by Olamide Hernandez MD at [facility] ENDOSCOPY    HX COLECTOMY      HX COLONOSCOPY      HX OTHER SURGICAL      liver surgery     Data:    Visit Vitals    /60    Pulse 68    Temp 97.8 °F (36.6 °C)    Resp 10    Ht 5' 10\" (1.778 m)    Wt 120.1 kg (264 lb 12.8 oz)    SpO2 99%    BMI 37.99 kg/m2   :  Recent Labs      08/17/17   0746   PLT  157     Recent Labs      08/17/17   0746   INR  1.0   APTT  25.2       The H & P and/or progress notes and any available imaging were reviewed. The risks, indications and possible alternatives to the procedure, including doing nothing, were discussed and informed consent was obtained. Physical Exam:      Mental status:   Alert and oriented. Examination specific to the procedure proposed to be performed and any co morbid conditions:   Mallampati classification 3   Heart:   Regular Rate. Lungs:   CTAB. No wheezes, rales or rhonchi. The patient is an appropriate candidate to undergo the planned procedure and sedation.     Funmilayo Gregory MD [Negative] : Respiratory [[email or web address]] : hpi